# Patient Record
Sex: FEMALE | Race: WHITE | URBAN - METROPOLITAN AREA
[De-identification: names, ages, dates, MRNs, and addresses within clinical notes are randomized per-mention and may not be internally consistent; named-entity substitution may affect disease eponyms.]

---

## 2019-10-12 ENCOUNTER — OUTPATIENT (OUTPATIENT)
Dept: OUTPATIENT SERVICES | Facility: HOSPITAL | Age: 69
LOS: 1 days | Discharge: HOME | End: 2019-10-12

## 2019-10-12 DIAGNOSIS — C50.919 MALIGNANT NEOPLASM OF UNSPECIFIED SITE OF UNSPECIFIED FEMALE BREAST: ICD-10-CM

## 2019-10-17 ENCOUNTER — OUTPATIENT (OUTPATIENT)
Dept: OUTPATIENT SERVICES | Facility: HOSPITAL | Age: 69
LOS: 1 days | Discharge: HOME | End: 2019-10-17

## 2019-10-17 ENCOUNTER — APPOINTMENT (OUTPATIENT)
Dept: HEMATOLOGY ONCOLOGY | Facility: CLINIC | Age: 69
End: 2019-10-17
Payer: MEDICARE

## 2019-10-17 VITALS
SYSTOLIC BLOOD PRESSURE: 160 MMHG | HEART RATE: 107 BPM | DIASTOLIC BLOOD PRESSURE: 124 MMHG | TEMPERATURE: 99.3 F | WEIGHT: 197 LBS | BODY MASS INDEX: 33.63 KG/M2 | HEIGHT: 64 IN | OXYGEN SATURATION: 93 %

## 2019-10-17 DIAGNOSIS — C50.919 MALIGNANT NEOPLASM OF UNSPECIFIED SITE OF UNSPECIFIED FEMALE BREAST: ICD-10-CM

## 2019-10-17 PROCEDURE — 99204 OFFICE O/P NEW MOD 45 MIN: CPT

## 2019-10-20 NOTE — ASSESSMENT
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

## 2019-10-20 NOTE — HISTORY OF PRESENT ILLNESS
[de-identified] : Ms. LOPEZ is a 69 year y/o female presenting for an initial consult regarding treatment options for right breast IDC, 0.55 cm, ER+/MD+, Her2-. She is s/p bilateral mastectomy with bilateral axillary sentinel lymph node biopsies on 9/19/19 with Dr. Raymond Branham. She has hx of asthma, osteoporosis, OA, DM, and HLD. \par \par Surgical Pathology 9/19/19:\par -All left and right axillary sentinel lymph nodes are benign. \par D. Left breast, mastectomy:\par -Fibrocystic changes including florid intraductal hyperplasia. \par -Nipple without significant pathology diagnosis. \par H. Right breast stitch marks tail, mastectomy:\par -biopsy site changes no residual tumor seen.\par -Fibrocystic changes including florid intraductal hyperplasia. \par -Atypical ductal hyperplasia.\par -Nipple without significant pathology diagnosis. \par \par Biopsy Pathology 9/13/19:\par Breast, right, 2:00,needle core biopsy:\par -IDC, grade 2, 0.55 cm \par -ER positive, >90%\par -MD positive, >90%\par -Rlz2tac negative, equivocal 2+ [de-identified] : C/o of her drains tugging on her skin.\par States she was in a lot of pain last night, but no pain today.

## 2019-10-20 NOTE — PHYSICAL EXAM
[Normal] : affect appropriate [de-identified] : bilateral mastectomy, staples in place, drains on both sides

## 2019-10-21 ENCOUNTER — RESULT REVIEW (OUTPATIENT)
Age: 69
End: 2019-10-21

## 2020-04-09 ENCOUNTER — OUTPATIENT (OUTPATIENT)
Dept: OUTPATIENT SERVICES | Facility: HOSPITAL | Age: 70
LOS: 1 days | Discharge: HOME | End: 2020-04-09

## 2020-04-09 DIAGNOSIS — C50.919 MALIGNANT NEOPLASM OF UNSPECIFIED SITE OF UNSPECIFIED FEMALE BREAST: ICD-10-CM

## 2020-04-16 ENCOUNTER — APPOINTMENT (OUTPATIENT)
Dept: HEMATOLOGY ONCOLOGY | Facility: CLINIC | Age: 70
End: 2020-04-16
Payer: MEDICARE

## 2020-04-16 PROCEDURE — 99213 OFFICE O/P EST LOW 20 MIN: CPT | Mod: 95

## 2020-04-16 NOTE — HISTORY OF PRESENT ILLNESS
[Home] : at home, [unfilled] , at the time of the visit. [Other Location: e.g. Home (Enter Location, City,State)___] : at [unfilled] [de-identified] : Purnima consents to telemedicine visit during Covid pandemic\par \par She feels well, tolerating tamoxifen 20 mg ad adjuvant therapy for stage 1 ER+ invasive ductal breast cancer.\par No complaints.\par denies hot flashes or weight gain.

## 2020-04-16 NOTE — ASSESSMENT
[FreeTextEntry1] : Stage 1 ER+, invasive ductal breast cancer, S/P bilateral mastectomy, tolerating adjuvant tamoxifen without difficulty.\par \par Spent 145 minutes discussing current clinical status and future treatment plans.

## 2020-04-16 NOTE — PHYSICAL EXAM
[Fully active, able to carry on all pre-disease performance without restriction] : Status 0 - Fully active, able to carry on all pre-disease performance without restriction [de-identified] : Comfortable at rest

## 2020-08-17 ENCOUNTER — RX RENEWAL (OUTPATIENT)
Age: 70
End: 2020-08-17

## 2021-04-12 ENCOUNTER — OUTPATIENT (OUTPATIENT)
Dept: OUTPATIENT SERVICES | Facility: HOSPITAL | Age: 71
LOS: 1 days | Discharge: HOME | End: 2021-04-12

## 2021-04-12 DIAGNOSIS — C50.919 MALIGNANT NEOPLASM OF UNSPECIFIED SITE OF UNSPECIFIED FEMALE BREAST: ICD-10-CM

## 2021-04-15 ENCOUNTER — APPOINTMENT (OUTPATIENT)
Dept: HEMATOLOGY ONCOLOGY | Facility: CLINIC | Age: 71
End: 2021-04-15
Payer: MEDICARE

## 2021-04-15 ENCOUNTER — RESULT REVIEW (OUTPATIENT)
Age: 71
End: 2021-04-15

## 2021-04-15 VITALS
SYSTOLIC BLOOD PRESSURE: 129 MMHG | BODY MASS INDEX: 33.63 KG/M2 | DIASTOLIC BLOOD PRESSURE: 73 MMHG | HEART RATE: 102 BPM | OXYGEN SATURATION: 97 % | WEIGHT: 197 LBS | HEIGHT: 64 IN

## 2021-04-15 LAB
ALBUMIN SERPL ELPH-MCNC: 4 G/DL
ALP BLD-CCNC: 84 U/L
ALT SERPL-CCNC: 45 U/L
ANION GAP SERPL CALC-SCNC: 11 MMOL/L
AST SERPL-CCNC: 68 U/L
BASOPHILS # BLD AUTO: 0.1 K/UL — SIGNIFICANT CHANGE UP (ref 0–0.2)
BASOPHILS NFR BLD AUTO: 1.2 % — SIGNIFICANT CHANGE UP (ref 0–2)
BILIRUB SERPL-MCNC: 0.4 MG/DL
BUN SERPL-MCNC: 12 MG/DL
CALCIUM SERPL-MCNC: 9.3 MG/DL
CHLORIDE SERPL-SCNC: 104 MMOL/L
CO2 SERPL-SCNC: 27 MMOL/L
CREAT SERPL-MCNC: 0.65 MG/DL
EOSINOPHIL # BLD AUTO: 0.2 K/UL — SIGNIFICANT CHANGE UP (ref 0–0.5)
EOSINOPHIL NFR BLD AUTO: 2.3 % — SIGNIFICANT CHANGE UP (ref 0–6)
GLUCOSE SERPL-MCNC: 146 MG/DL
HCT VFR BLD CALC: 38.4 % — SIGNIFICANT CHANGE UP (ref 34.5–45)
HGB BLD-MCNC: 12.4 G/DL — SIGNIFICANT CHANGE UP (ref 11.5–15.5)
LYMPHOCYTES # BLD AUTO: 1.9 K/UL — SIGNIFICANT CHANGE UP (ref 1–3.3)
LYMPHOCYTES # BLD AUTO: 27.9 % — SIGNIFICANT CHANGE UP (ref 13–44)
MCHC RBC-ENTMCNC: 31.5 PG — SIGNIFICANT CHANGE UP (ref 27–34)
MCHC RBC-ENTMCNC: 32.2 G/DL — SIGNIFICANT CHANGE UP (ref 32–36)
MCV RBC AUTO: 97.8 FL — SIGNIFICANT CHANGE UP (ref 80–100)
MONOCYTES # BLD AUTO: 0.5 K/UL — SIGNIFICANT CHANGE UP (ref 0–0.9)
MONOCYTES NFR BLD AUTO: 7.9 % — SIGNIFICANT CHANGE UP (ref 2–14)
NEUTROPHILS # BLD AUTO: 4.1 K/UL — SIGNIFICANT CHANGE UP (ref 1.8–7.4)
NEUTROPHILS NFR BLD AUTO: 60.6 % — SIGNIFICANT CHANGE UP (ref 43–77)
PLATELET # BLD AUTO: 155 K/UL — SIGNIFICANT CHANGE UP (ref 150–400)
POTASSIUM SERPL-SCNC: 4.2 MMOL/L
PROT SERPL-MCNC: 6.7 G/DL
RBC # BLD: 3.93 M/UL — SIGNIFICANT CHANGE UP (ref 3.8–5.2)
RBC # FLD: 12.9 % — SIGNIFICANT CHANGE UP (ref 10.3–14.5)
SODIUM SERPL-SCNC: 142 MMOL/L
WBC # BLD: 6.8 K/UL — SIGNIFICANT CHANGE UP (ref 3.8–10.5)
WBC # FLD AUTO: 6.8 K/UL — SIGNIFICANT CHANGE UP (ref 3.8–10.5)

## 2021-04-15 PROCEDURE — 99213 OFFICE O/P EST LOW 20 MIN: CPT

## 2021-04-16 NOTE — HISTORY OF PRESENT ILLNESS
[de-identified] : \par Ms. LOPEZ is a 71 year y/o female followed for right breast IDC, 0.55 cm, ER+/HI+, Her2-. She is s/p bilateral mastectomy with bilateral axillary sentinel lymph node biopsies on 9/19/19 with Dr. Raymond Branham. She has hx of asthma, osteoporosis, OA, DM, and HLD. \par \par Surgical Pathology 9/19/19:\par -All left and right axillary sentinel lymph nodes are benign. \par D. Left breast, mastectomy:\par -Fibrocystic changes including florid intraductal hyperplasia. \par -Nipple without significant pathology diagnosis. \par H. Right breast stitch marks tail, mastectomy:\par -biopsy site changes no residual tumor seen.\par -Fibrocystic changes including florid intraductal hyperplasia. \par -Atypical ductal hyperplasia.\par -Nipple without significant pathology diagnosis. \par \par Biopsy Pathology 9/13/19:\par Breast, right, 2:00,needle core biopsy:\par -IDC, grade 2, 0.55 cm \par -ER positive, >90%\par -HI positive, >90%\par -Oai8qid negative, equivocal 2+. \par \par  [de-identified] : Doing well on adjuvant tamoxifen, with no side effects.\par No evidence of recurrent disease.

## 2021-04-16 NOTE — ASSESSMENT
[FreeTextEntry1] : \par Stage 1 ER+, invasive ductal breast cancer, S/P bilateral mastectomy, tolerating adjuvant tamoxifen without difficulty.\par

## 2021-04-16 NOTE — PHYSICAL EXAM
[Normal] : affect appropriate [de-identified] : Status post bilateral mastectomy with no evidence of recurrence

## 2021-10-26 ENCOUNTER — RX RENEWAL (OUTPATIENT)
Age: 71
End: 2021-10-26

## 2022-02-03 ENCOUNTER — INPATIENT (INPATIENT)
Facility: HOSPITAL | Age: 72
LOS: 7 days | Discharge: HOME | DRG: 915 | End: 2022-02-11
Attending: STUDENT IN AN ORGANIZED HEALTH CARE EDUCATION/TRAINING PROGRAM | Admitting: INTERNAL MEDICINE
Payer: MEDICARE

## 2022-02-03 VITALS
TEMPERATURE: 98 F | DIASTOLIC BLOOD PRESSURE: 112 MMHG | RESPIRATION RATE: 18 BRPM | OXYGEN SATURATION: 97 % | HEART RATE: 112 BPM | SYSTOLIC BLOOD PRESSURE: 178 MMHG

## 2022-02-03 DIAGNOSIS — T78.3XXA ANGIONEUROTIC EDEMA, INITIAL ENCOUNTER: ICD-10-CM

## 2022-02-03 LAB
ABO RH CONFIRMATION: SIGNIFICANT CHANGE UP
ALBUMIN SERPL ELPH-MCNC: 4.2 G/DL — SIGNIFICANT CHANGE UP (ref 3.3–5.2)
ALP SERPL-CCNC: 94 U/L — SIGNIFICANT CHANGE UP (ref 40–120)
ALT FLD-CCNC: 52 U/L — HIGH
ANION GAP SERPL CALC-SCNC: 16 MMOL/L — SIGNIFICANT CHANGE UP (ref 5–17)
APTT BLD: 32.5 SEC — SIGNIFICANT CHANGE UP (ref 27.5–35.5)
AST SERPL-CCNC: 77 U/L — HIGH
BASOPHILS # BLD AUTO: 0.1 K/UL — SIGNIFICANT CHANGE UP (ref 0–0.2)
BASOPHILS NFR BLD AUTO: 0.7 % — SIGNIFICANT CHANGE UP (ref 0–2)
BILIRUB SERPL-MCNC: 0.5 MG/DL — SIGNIFICANT CHANGE UP (ref 0.4–2)
BLD GP AB SCN SERPL QL: SIGNIFICANT CHANGE UP
BUN SERPL-MCNC: 17.1 MG/DL — SIGNIFICANT CHANGE UP (ref 8–20)
CALCIUM SERPL-MCNC: 9.7 MG/DL — SIGNIFICANT CHANGE UP (ref 8.6–10.2)
CHLORIDE SERPL-SCNC: 101 MMOL/L — SIGNIFICANT CHANGE UP (ref 98–107)
CO2 SERPL-SCNC: 25 MMOL/L — SIGNIFICANT CHANGE UP (ref 22–29)
CREAT SERPL-MCNC: 0.81 MG/DL — SIGNIFICANT CHANGE UP (ref 0.5–1.3)
EOSINOPHIL # BLD AUTO: 0.22 K/UL — SIGNIFICANT CHANGE UP (ref 0–0.5)
EOSINOPHIL NFR BLD AUTO: 1.6 % — SIGNIFICANT CHANGE UP (ref 0–6)
FLUAV AG NPH QL: SIGNIFICANT CHANGE UP
FLUBV AG NPH QL: SIGNIFICANT CHANGE UP
GAS PNL BLDA: SIGNIFICANT CHANGE UP
GLUCOSE BLDC GLUCOMTR-MCNC: 203 MG/DL — HIGH (ref 70–99)
GLUCOSE BLDC GLUCOMTR-MCNC: 234 MG/DL — HIGH (ref 70–99)
GLUCOSE BLDC GLUCOMTR-MCNC: 245 MG/DL — HIGH (ref 70–99)
GLUCOSE BLDC GLUCOMTR-MCNC: 256 MG/DL — HIGH (ref 70–99)
GLUCOSE SERPL-MCNC: 143 MG/DL — HIGH (ref 70–99)
HCT VFR BLD CALC: 43.9 % — SIGNIFICANT CHANGE UP (ref 34.5–45)
HGB BLD-MCNC: 14.6 G/DL — SIGNIFICANT CHANGE UP (ref 11.5–15.5)
IMM GRANULOCYTES NFR BLD AUTO: 0.4 % — SIGNIFICANT CHANGE UP (ref 0–1.5)
INR BLD: 1.16 RATIO — SIGNIFICANT CHANGE UP (ref 0.88–1.16)
LIDOCAIN IGE QN: 34 U/L — SIGNIFICANT CHANGE UP (ref 22–51)
LYMPHOCYTES # BLD AUTO: 33.6 % — SIGNIFICANT CHANGE UP (ref 13–44)
LYMPHOCYTES # BLD AUTO: 4.54 K/UL — HIGH (ref 1–3.3)
MAGNESIUM SERPL-MCNC: 1.8 MG/DL — SIGNIFICANT CHANGE UP (ref 1.6–2.6)
MCHC RBC-ENTMCNC: 32.3 PG — SIGNIFICANT CHANGE UP (ref 27–34)
MCHC RBC-ENTMCNC: 33.3 GM/DL — SIGNIFICANT CHANGE UP (ref 32–36)
MCV RBC AUTO: 97.1 FL — SIGNIFICANT CHANGE UP (ref 80–100)
MONOCYTES # BLD AUTO: 1.46 K/UL — HIGH (ref 0–0.9)
MONOCYTES NFR BLD AUTO: 10.8 % — SIGNIFICANT CHANGE UP (ref 2–14)
NEUTROPHILS # BLD AUTO: 7.13 K/UL — SIGNIFICANT CHANGE UP (ref 1.8–7.4)
NEUTROPHILS NFR BLD AUTO: 52.9 % — SIGNIFICANT CHANGE UP (ref 43–77)
PHOSPHATE SERPL-MCNC: 4.6 MG/DL — SIGNIFICANT CHANGE UP (ref 2.4–4.7)
PLATELET # BLD AUTO: 212 K/UL — SIGNIFICANT CHANGE UP (ref 150–400)
POTASSIUM SERPL-MCNC: 3.2 MMOL/L — LOW (ref 3.5–5.3)
POTASSIUM SERPL-SCNC: 3.2 MMOL/L — LOW (ref 3.5–5.3)
PROT SERPL-MCNC: 7.6 G/DL — SIGNIFICANT CHANGE UP (ref 6.6–8.7)
PROTHROM AB SERPL-ACNC: 13.4 SEC — SIGNIFICANT CHANGE UP (ref 10.6–13.6)
RBC # BLD: 4.52 M/UL — SIGNIFICANT CHANGE UP (ref 3.8–5.2)
RBC # FLD: 13.1 % — SIGNIFICANT CHANGE UP (ref 10.3–14.5)
RSV RNA NPH QL NAA+NON-PROBE: SIGNIFICANT CHANGE UP
SARS-COV-2 RNA SPEC QL NAA+PROBE: SIGNIFICANT CHANGE UP
SODIUM SERPL-SCNC: 141 MMOL/L — SIGNIFICANT CHANGE UP (ref 135–145)
TROPONIN T SERPL-MCNC: <0.01 NG/ML — SIGNIFICANT CHANGE UP (ref 0–0.06)
WBC # BLD: 13.5 K/UL — HIGH (ref 3.8–10.5)
WBC # FLD AUTO: 13.5 K/UL — HIGH (ref 3.8–10.5)

## 2022-02-03 PROCEDURE — 71260 CT THORAX DX C+: CPT | Mod: 26

## 2022-02-03 PROCEDURE — 71045 X-RAY EXAM CHEST 1 VIEW: CPT | Mod: 26

## 2022-02-03 PROCEDURE — 99291 CRITICAL CARE FIRST HOUR: CPT

## 2022-02-03 PROCEDURE — 70491 CT SOFT TISSUE NECK W/DYE: CPT | Mod: 26

## 2022-02-03 RX ORDER — MEROPENEM 1 G/30ML
2000 INJECTION INTRAVENOUS ONCE
Refills: 0 | Status: COMPLETED | OUTPATIENT
Start: 2022-02-03 | End: 2022-02-03

## 2022-02-03 RX ORDER — TAMOXIFEN CITRATE 20 MG/1
1 TABLET, FILM COATED ORAL
Qty: 0 | Refills: 0 | DISCHARGE

## 2022-02-03 RX ORDER — DEXAMETHASONE 0.5 MG/5ML
6 ELIXIR ORAL EVERY 6 HOURS
Refills: 0 | Status: DISCONTINUED | OUTPATIENT
Start: 2022-02-03 | End: 2022-02-07

## 2022-02-03 RX ORDER — FAMOTIDINE 10 MG/ML
20 INJECTION INTRAVENOUS ONCE
Refills: 0 | Status: COMPLETED | OUTPATIENT
Start: 2022-02-03 | End: 2022-02-03

## 2022-02-03 RX ORDER — FAMOTIDINE 10 MG/ML
20 INJECTION INTRAVENOUS EVERY 12 HOURS
Refills: 0 | Status: DISCONTINUED | OUTPATIENT
Start: 2022-02-03 | End: 2022-02-07

## 2022-02-03 RX ORDER — GLUCAGON INJECTION, SOLUTION 0.5 MG/.1ML
1 INJECTION, SOLUTION SUBCUTANEOUS ONCE
Refills: 0 | Status: DISCONTINUED | OUTPATIENT
Start: 2022-02-03 | End: 2022-02-07

## 2022-02-03 RX ORDER — ASPIRIN/CALCIUM CARB/MAGNESIUM 324 MG
1 TABLET ORAL
Qty: 0 | Refills: 0 | DISCHARGE

## 2022-02-03 RX ORDER — SODIUM CHLORIDE 9 MG/ML
1000 INJECTION, SOLUTION INTRAVENOUS
Refills: 0 | Status: DISCONTINUED | OUTPATIENT
Start: 2022-02-03 | End: 2022-02-09

## 2022-02-03 RX ORDER — MEROPENEM 1 G/30ML
INJECTION INTRAVENOUS
Refills: 0 | Status: COMPLETED | OUTPATIENT
Start: 2022-02-03 | End: 2022-02-07

## 2022-02-03 RX ORDER — CHLORHEXIDINE GLUCONATE 213 G/1000ML
15 SOLUTION TOPICAL EVERY 12 HOURS
Refills: 0 | Status: DISCONTINUED | OUTPATIENT
Start: 2022-02-03 | End: 2022-02-09

## 2022-02-03 RX ORDER — DEXTROSE 50 % IN WATER 50 %
12.5 SYRINGE (ML) INTRAVENOUS ONCE
Refills: 0 | Status: DISCONTINUED | OUTPATIENT
Start: 2022-02-03 | End: 2022-02-11

## 2022-02-03 RX ORDER — MEROPENEM 1 G/30ML
2000 INJECTION INTRAVENOUS EVERY 8 HOURS
Refills: 0 | Status: COMPLETED | OUTPATIENT
Start: 2022-02-03 | End: 2022-02-07

## 2022-02-03 RX ORDER — POTASSIUM CHLORIDE 20 MEQ
10 PACKET (EA) ORAL
Refills: 0 | Status: COMPLETED | OUTPATIENT
Start: 2022-02-03 | End: 2022-02-03

## 2022-02-03 RX ORDER — DEXTROSE 50 % IN WATER 50 %
25 SYRINGE (ML) INTRAVENOUS ONCE
Refills: 0 | Status: DISCONTINUED | OUTPATIENT
Start: 2022-02-03 | End: 2022-02-11

## 2022-02-03 RX ORDER — INSULIN LISPRO 100/ML
VIAL (ML) SUBCUTANEOUS EVERY 6 HOURS
Refills: 0 | Status: DISCONTINUED | OUTPATIENT
Start: 2022-02-03 | End: 2022-02-11

## 2022-02-03 RX ORDER — FENTANYL CITRATE 50 UG/ML
0.5 INJECTION INTRAVENOUS
Qty: 2500 | Refills: 0 | Status: DISCONTINUED | OUTPATIENT
Start: 2022-02-03 | End: 2022-02-07

## 2022-02-03 RX ORDER — DEXTROSE 50 % IN WATER 50 %
15 SYRINGE (ML) INTRAVENOUS ONCE
Refills: 0 | Status: DISCONTINUED | OUTPATIENT
Start: 2022-02-03 | End: 2022-02-09

## 2022-02-03 RX ORDER — FLUTICASONE PROPIONATE 50 MCG
1 SPRAY, SUSPENSION NASAL
Qty: 0 | Refills: 0 | DISCHARGE

## 2022-02-03 RX ORDER — MEROPENEM 1 G/30ML
2 INJECTION INTRAVENOUS EVERY 8 HOURS
Refills: 0 | Status: DISCONTINUED | OUTPATIENT
Start: 2022-02-03 | End: 2022-02-03

## 2022-02-03 RX ORDER — INSULIN GLARGINE 100 [IU]/ML
15 INJECTION, SOLUTION SUBCUTANEOUS ONCE
Refills: 0 | Status: COMPLETED | OUTPATIENT
Start: 2022-02-03 | End: 2022-02-03

## 2022-02-03 RX ORDER — AMPICILLIN SODIUM AND SULBACTAM SODIUM 250; 125 MG/ML; MG/ML
3 INJECTION, POWDER, FOR SUSPENSION INTRAMUSCULAR; INTRAVENOUS EVERY 6 HOURS
Refills: 0 | Status: DISCONTINUED | OUTPATIENT
Start: 2022-02-03 | End: 2022-02-03

## 2022-02-03 RX ORDER — AMPICILLIN SODIUM AND SULBACTAM SODIUM 250; 125 MG/ML; MG/ML
INJECTION, POWDER, FOR SUSPENSION INTRAMUSCULAR; INTRAVENOUS
Refills: 0 | Status: DISCONTINUED | OUTPATIENT
Start: 2022-02-03 | End: 2022-02-03

## 2022-02-03 RX ORDER — NOREPINEPHRINE BITARTRATE/D5W 8 MG/250ML
0.05 PLASTIC BAG, INJECTION (ML) INTRAVENOUS
Qty: 8 | Refills: 0 | Status: DISCONTINUED | OUTPATIENT
Start: 2022-02-03 | End: 2022-02-07

## 2022-02-03 RX ORDER — CYCLOBENZAPRINE HYDROCHLORIDE 10 MG/1
1 TABLET, FILM COATED ORAL
Qty: 0 | Refills: 0 | DISCHARGE

## 2022-02-03 RX ORDER — ENOXAPARIN SODIUM 100 MG/ML
40 INJECTION SUBCUTANEOUS DAILY
Refills: 0 | Status: DISCONTINUED | OUTPATIENT
Start: 2022-02-03 | End: 2022-02-11

## 2022-02-03 RX ORDER — DIPHENHYDRAMINE HCL 50 MG
50 CAPSULE ORAL ONCE
Refills: 0 | Status: COMPLETED | OUTPATIENT
Start: 2022-02-03 | End: 2022-02-03

## 2022-02-03 RX ORDER — PROPOFOL 10 MG/ML
10 INJECTION, EMULSION INTRAVENOUS
Qty: 1000 | Refills: 0 | Status: DISCONTINUED | OUTPATIENT
Start: 2022-02-03 | End: 2022-02-04

## 2022-02-03 RX ORDER — VECURONIUM BROMIDE 20 MG/1
10 INJECTION, POWDER, FOR SOLUTION INTRAVENOUS ONCE
Refills: 0 | Status: COMPLETED | OUTPATIENT
Start: 2022-02-03 | End: 2022-02-03

## 2022-02-03 RX ORDER — INSULIN GLARGINE 100 [IU]/ML
25 INJECTION, SOLUTION SUBCUTANEOUS AT BEDTIME
Refills: 0 | Status: DISCONTINUED | OUTPATIENT
Start: 2022-02-04 | End: 2022-02-11

## 2022-02-03 RX ORDER — DIPHENHYDRAMINE HCL 50 MG
50 CAPSULE ORAL EVERY 6 HOURS
Refills: 0 | Status: DISCONTINUED | OUTPATIENT
Start: 2022-02-03 | End: 2022-02-07

## 2022-02-03 RX ORDER — DEXAMETHASONE 0.5 MG/5ML
10 ELIXIR ORAL ONCE
Refills: 0 | Status: COMPLETED | OUTPATIENT
Start: 2022-02-03 | End: 2022-02-03

## 2022-02-03 RX ORDER — AMPICILLIN SODIUM AND SULBACTAM SODIUM 250; 125 MG/ML; MG/ML
3 INJECTION, POWDER, FOR SUSPENSION INTRAMUSCULAR; INTRAVENOUS ONCE
Refills: 0 | Status: DISCONTINUED | OUTPATIENT
Start: 2022-02-03 | End: 2022-02-03

## 2022-02-03 RX ORDER — MIDAZOLAM HYDROCHLORIDE 1 MG/ML
2 INJECTION, SOLUTION INTRAMUSCULAR; INTRAVENOUS
Refills: 0 | Status: DISCONTINUED | OUTPATIENT
Start: 2022-02-03 | End: 2022-02-07

## 2022-02-03 RX ORDER — INSULIN LISPRO 100/ML
6 VIAL (ML) SUBCUTANEOUS EVERY 6 HOURS
Refills: 0 | Status: DISCONTINUED | OUTPATIENT
Start: 2022-02-03 | End: 2022-02-11

## 2022-02-03 RX ORDER — EPINEPHRINE 0.3 MG/.3ML
0.3 INJECTION INTRAMUSCULAR; SUBCUTANEOUS ONCE
Refills: 0 | Status: COMPLETED | OUTPATIENT
Start: 2022-02-03 | End: 2022-02-03

## 2022-02-03 RX ORDER — CHLORHEXIDINE GLUCONATE 213 G/1000ML
1 SOLUTION TOPICAL
Refills: 0 | Status: DISCONTINUED | OUTPATIENT
Start: 2022-02-03 | End: 2022-02-11

## 2022-02-03 RX ADMIN — FENTANYL CITRATE 4 MICROGRAM(S)/KG/HR: 50 INJECTION INTRAVENOUS at 06:14

## 2022-02-03 RX ADMIN — MIDAZOLAM HYDROCHLORIDE 2 MILLIGRAM(S): 1 INJECTION, SOLUTION INTRAMUSCULAR; INTRAVENOUS at 23:02

## 2022-02-03 RX ADMIN — CHLORHEXIDINE GLUCONATE 15 MILLILITER(S): 213 SOLUTION TOPICAL at 06:20

## 2022-02-03 RX ADMIN — Medication 6 MILLIGRAM(S): at 23:03

## 2022-02-03 RX ADMIN — FAMOTIDINE 20 MILLIGRAM(S): 10 INJECTION INTRAVENOUS at 17:43

## 2022-02-03 RX ADMIN — Medication 50 MILLIGRAM(S): at 15:11

## 2022-02-03 RX ADMIN — Medication 100 MILLIEQUIVALENT(S): at 09:14

## 2022-02-03 RX ADMIN — EPINEPHRINE 0.3 MILLIGRAM(S): 0.3 INJECTION INTRAMUSCULAR; SUBCUTANEOUS at 03:31

## 2022-02-03 RX ADMIN — MIDAZOLAM HYDROCHLORIDE 2 MILLIGRAM(S): 1 INJECTION, SOLUTION INTRAMUSCULAR; INTRAVENOUS at 15:57

## 2022-02-03 RX ADMIN — Medication 7.5 MICROGRAM(S)/KG/MIN: at 06:14

## 2022-02-03 RX ADMIN — Medication 100 MILLIEQUIVALENT(S): at 07:38

## 2022-02-03 RX ADMIN — FAMOTIDINE 20 MILLIGRAM(S): 10 INJECTION INTRAVENOUS at 03:32

## 2022-02-03 RX ADMIN — MEROPENEM 200 MILLIGRAM(S): 1 INJECTION INTRAVENOUS at 07:40

## 2022-02-03 RX ADMIN — Medication 40 MILLIGRAM(S): at 12:13

## 2022-02-03 RX ADMIN — Medication 50 MILLIGRAM(S): at 06:20

## 2022-02-03 RX ADMIN — FENTANYL CITRATE 4 MICROGRAM(S)/KG/HR: 50 INJECTION INTRAVENOUS at 15:57

## 2022-02-03 RX ADMIN — Medication 50 MILLIGRAM(S): at 21:49

## 2022-02-03 RX ADMIN — Medication 6 MILLIGRAM(S): at 12:20

## 2022-02-03 RX ADMIN — Medication 6 MILLIGRAM(S): at 06:16

## 2022-02-03 RX ADMIN — Medication 50 MILLIGRAM(S): at 12:20

## 2022-02-03 RX ADMIN — PROPOFOL 4.8 MICROGRAM(S)/KG/MIN: 10 INJECTION, EMULSION INTRAVENOUS at 15:57

## 2022-02-03 RX ADMIN — MEROPENEM 200 MILLIGRAM(S): 1 INJECTION INTRAVENOUS at 15:11

## 2022-02-03 RX ADMIN — PROPOFOL 4.8 MICROGRAM(S)/KG/MIN: 10 INJECTION, EMULSION INTRAVENOUS at 19:16

## 2022-02-03 RX ADMIN — PROPOFOL 4.8 MICROGRAM(S)/KG/MIN: 10 INJECTION, EMULSION INTRAVENOUS at 23:08

## 2022-02-03 RX ADMIN — Medication 4: at 06:24

## 2022-02-03 RX ADMIN — Medication 3 UNIT(S): at 23:03

## 2022-02-03 RX ADMIN — MEROPENEM 200 MILLIGRAM(S): 1 INJECTION INTRAVENOUS at 23:05

## 2022-02-03 RX ADMIN — Medication 4: at 17:44

## 2022-02-03 RX ADMIN — PROPOFOL 4.8 MICROGRAM(S)/KG/MIN: 10 INJECTION, EMULSION INTRAVENOUS at 06:14

## 2022-02-03 RX ADMIN — Medication 4: at 23:03

## 2022-02-03 RX ADMIN — Medication 40 MILLIGRAM(S): at 20:32

## 2022-02-03 RX ADMIN — VECURONIUM BROMIDE 10 MILLIGRAM(S): 20 INJECTION, POWDER, FOR SOLUTION INTRAVENOUS at 15:58

## 2022-02-03 RX ADMIN — MIDAZOLAM HYDROCHLORIDE 2 MILLIGRAM(S): 1 INJECTION, SOLUTION INTRAMUSCULAR; INTRAVENOUS at 06:15

## 2022-02-03 RX ADMIN — Medication 6 MILLIGRAM(S): at 17:43

## 2022-02-03 RX ADMIN — ENOXAPARIN SODIUM 40 MILLIGRAM(S): 100 INJECTION SUBCUTANEOUS at 12:21

## 2022-02-03 RX ADMIN — INSULIN GLARGINE 15 UNIT(S): 100 INJECTION, SOLUTION SUBCUTANEOUS at 16:37

## 2022-02-03 RX ADMIN — CHLORHEXIDINE GLUCONATE 1 APPLICATION(S): 213 SOLUTION TOPICAL at 06:21

## 2022-02-03 RX ADMIN — FAMOTIDINE 20 MILLIGRAM(S): 10 INJECTION INTRAVENOUS at 06:15

## 2022-02-03 RX ADMIN — Medication 6: at 12:33

## 2022-02-03 RX ADMIN — Medication 10 MILLIGRAM(S): at 03:32

## 2022-02-03 RX ADMIN — Medication 100 MILLIEQUIVALENT(S): at 06:21

## 2022-02-03 RX ADMIN — CHLORHEXIDINE GLUCONATE 15 MILLILITER(S): 213 SOLUTION TOPICAL at 17:44

## 2022-02-03 NOTE — ED PROVIDER NOTE - CRITICAL CARE ATTENDING CONTRIBUTION TO CARE
Upon my evaluation, this patient had a high probability of imminent or life-threatening deterioration due to ace inhibitor angioedema which required my direct attention, intervention, and personal management.  The patient has a  medical condition that impairs one or more vital organ systems.  Frequent personal assessment and adjustment of medical interventions was performed.      I have personally provided 35 minutes of critical care time exclusive of time spent on separately billable procedures. Time includes review of laboratory data, radiology results, discussion with consultants, patient and family; monitoring for potential decompensation, as well as time spent retrieving data and reviewing the chart and documenting the visit. Interventions were performed as documented above.

## 2022-02-03 NOTE — ED ADULT NURSE NOTE - OBJECTIVE STATEMENT
Assumed care of pt at 03:12 in CC. Pt A&Ox3 c/o difficulty speaking s/p angioedema. As per pt, she takes PO "lisinopril for many years" and woke up from sleep c/o difficulty breathing and speech difficulties. Pt bought to CC area of ED, MD Webster and MD Ochoa at bedside w/ ED code team. Anesthesia called to bedside. Pt placed on CM in ST and SPO2 WNL on RA.

## 2022-02-03 NOTE — H&P ADULT - ATTENDING COMMENTS
71F w/ PMHx HTN, asthma, B/L mastectomy presented on 02/03 w/ acute onset swelling of her tongue and base of mouth. Emergently taken to the OR and underwent nasotracheal intubation by anesthesiology w/ ACS standby   Likely Cristian's angina. Angioedema in setting of ACEI also in differential     Given 1U FFP in ED, plan to give 2more units in addition to IV steroids, H1/H2 blockers  CT neck w/ contrast, BCx and started on Merrem 2g q8 (PCN allergy). Will need ENT eval   Critical airway, maintain RAAS -3 to -5, CXR pending  Called and updated daughter

## 2022-02-03 NOTE — ED PROVIDER NOTE - PHYSICAL EXAMINATION
Gen: in moderate respiratory distress   Head: NC/AT, + submandibular swelling, + tongue swelling, not tolerating secretion, cannot visualize oropharynx   Neck: trachea midline, no crepitus   Card: tachycardic, regular rhythm   Resp:  CTAB  Abd: soft, non-tender  Ext: no deformities  Neuro:  A&O appears non focal  Skin:  Warm and dry as visualized  Psych:  Normal affect and mood Gen: in moderate respiratory distress   Head: NC/AT, + large amount of submandibular swelling, + tongue diffusely edematous, obstructing entire mouth, not tolerating secretion. No crepitus.  Neck: trachea midline  Card: tachycardic, regular rhythm   Resp:  CTAB  Abd: soft, non-tender  Ext: no deformities  Neuro:  A&O appears non focal  Skin:  Warm and dry as visualized  Psych:  Normal affect and mood

## 2022-02-03 NOTE — H&P ADULT - NSHPLABSRESULTS_GEN_ALL_CORE
14.6   13.50 )-----------( 212      ( 03 Feb 2022 03:22 )             43.9   02-03    141  |  101  |  17.1  ----------------------------<  143<H>  3.2<L>   |  25.0  |  0.81    Ca    9.7      03 Feb 2022 03:22  Phos  4.6     02-03  Mg     1.8     02-03    TPro  7.6  /  Alb  4.2  /  TBili  0.5  /  DBili  x   /  AST  77<H>  /  ALT  52<H>  /  AlkPhos  94  02-03

## 2022-02-03 NOTE — ED ADULT TRIAGE NOTE - CHIEF COMPLAINT QUOTE
Ambulatory with acute angioedema unable to tolerate secretions. Snoring respirations. Brought straight to critical care, anesthesia called for possible intubation.

## 2022-02-03 NOTE — ED PROVIDER NOTE - OBJECTIVE STATEMENT
70 y/o female with PMHx of HTN, asthma presents to the ED with angioedema. Pt is on Lisinopril (has been on for a long time) and states she went to bed fine and woke up around 1:30 with difficulty breathing, facial, tongue and submandibular swelling.     Pt daughter  Jamilah

## 2022-02-03 NOTE — PATIENT PROFILE ADULT - FALL HARM RISK - HARM RISK INTERVENTIONS

## 2022-02-03 NOTE — ED ADULT NURSE NOTE - NSFALLRSKINDICATORS_ED_ALL_ED
Paul Carlos (:  1976) is a 39 y.o. female, here for evaluation of the following chief complaint(s):  No chief complaint on file. ASSESSMENT/PLAN:  1. Chronic rhinitis  2. Deviated nasal septum  3. Hyposmia  4. Nasal valve collapse  5. Inferior turbinate hypertrophy      This is a very pleasant 39 y.o. female here today for evaluation of the the above-noted complaints. Regarding her sinonasal complaints, we discussed that she would be a good candidate for an open septorhinoplasty to address her nasal valve collapse,  septal deviation and turbinate hypertrophy as well as posterior ablation of her nasal nerves. We will plan to do alar batten grafts address her external nasal valve collapse and possible  grafts. Pre-operative pictures were obtained and are available in the EMR. I discussed with her that I do not think she is getting sinus infections but rather flares of her nasal allergies. If she were to undergo surgery the goals of surgery would be to help her breathe better through her nose as well as to improve the application of topical nasal steroids. I did discuss with her that this may not improve her symptoms that she perceives as frequent sinus infections and she expressed understanding.      -Risks and benefits of septoplasty, repair of nasal valve collapse, inferior turbinate reduction and posterior ablation of nasal nerves including pain, inflammation, infection, hemorrhage, cosmesis (including nasal valve collapse, widening of the nose or saddle nose deformity), worsened nasal airway obstruction, hyposmia/anosmia, numbness to the teeth or gums, cosmetic changes to the nose and injury to the septum including septal perforation, need for further surgery, headaches and other potential complications.   -General anesthesia carries independent risks which will be discussed by Anesthesiology on the day of surgery   -After a discussion of risks and benefits, the patient is in agreement with plan for the above procedure    The patient was counseled at length about the risks of tariq Covid-19 during their perioperative period and any recovery window from their procedure. The patient was made aware that tariq Covid-19  may worsen their prognosis for recovering from their procedure  and lend to a higher morbidity and/or mortality risk. All material risks, benefits, and reasonable alternatives including postponing the procedure were discussed. The patient does wish to proceed with the procedure at this time. SUBJECTIVE/OBJECTIVE:  Dena Johnson is here today for evaluation of evaluation of issues related to her nose. She gets multiple sinus infections per year. She requires antibiotics for these. She think she has a sinus infection right now. She also has over the last 3 months had a loss of sense of smell. She has not had Covid recently. She has had some issues related to smelling gasoline and cigarette smoke that is not actually present. She gets difficulty breathing through her nose it is worse on the left side. She will get constant nasal drainage that is clear and green. She gets very rare nosebleeds. Update 3/17/2012:    She is still having issues breathing out of her nose and with nasal congestion. She is still getting intermittent nasal drainage. Update 4/28/2021:    Patient presents today for follow-up. She is still having the same issues regarded to difficulty breathing out of her nose. She gets intermittent nasal drainage which is clear. She thinks she is getting sinus infections. Update 8/26/2021:    Patient presents today for follow-up regarding issues related to her chronic sinonasal complaints. She is still having significant difficulty breathing through her nose. This is exacerbated by exercise.   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 REVIEW OF SYSTEMS  The following systems were reviewed and revealed the following in addition to any already discussed in the HPI:    PHYSICAL EXAM    GENERAL: No acute distress, alert and oriented, no hoarseness, strong voice  EYES: EOMI, Anti-icteric  HENT:   Head: Normocephalic and atraumatic. Face:  Symmetric, facial nerve intact, no sinus tenderness  Right Ear: Normal external ear, normal external auditory canal, intact tympanic membrane with normal mobility and aerated middle ear  Left Ear: Normal external ear, normal external auditory canal, intact tympanic membrane with normal mobility and aerated middle ear  Mouth/Oral Cavity:  normal lips, Uvula is midline, no mucosal lesions, no trismus, normal dentition, normal salivary quality/flow  Oropharynx/Larynx:  normal oropharynx, 2+ tonsils; patient did not tolerate mirror exam due to excessive gag reflex  Nose:Normal external nasal appearance. Anterior rhinoscopy shows  a deviated septum preventing view posteriorly. Inflammation of turbinates. Normal mucosa   NECK: Normal range of motion, no thyromegaly, trachea is midline, no lymphadenopathy, no neck masses, no crepitus  CHEST: Normal respiratory effort, no retractions, breathing comfortably  SKIN: No rashes, normal appearing skin, no evidence of skin lesions/tumors  Neuro:  cranial nerve II-XII intact; normal gait  Cardio:  no edema    Modified Edith maneuver produced significant improvement in nasal airflow bilaterally. PROCEDURE  Nasal Endoscopy (CPT code 81168) from previous visit    Preop: chronic rhinitis  Postop: Same    Verbal consent was received. After topical anesthesia and decongestion had been obtained using aerosolized 1% lidocaine and oxymetazoline, a 45 degree rigid endoscope was placed into both nares with the patient in a sitting position.  The following was observed:    Right Nasal Cavity and Paranasal Sinuses:  Polyp score = 0 (0 = no polyps, 1 = small polyps in middle meatus not reaching below the inferior border of the middle yaron, 2 = polyps reaching below the middle border of the middle turbinate, 3= large polyps reaching the lower border of the inferior turbinate or polyps medial to the middle yaron, 4= large polyps causing almost complete congestion/obstruction of the interior meatus)  Edema score = 1 (0 = absent, 1 = mild, 2 = severe)  Discharge score = 0 (0 = no discharge, 1 = clear thin discharge, 2 = thick purulent discharge)    Left Nasal Cavity and Paranasal Sinuses:    Polyp score = 0 (0 = no polyps, 1 = small polyps in middle meatus not reaching below the inferior border of the middle yaron, 2 = polyps reaching below the middle border of the middle turbinate, 3= large polyps reaching the lower border of the inferior turbinate or polyps medial to the middle yaron, 4= large polyps causing almost complete congestion/obstruction of the interior meatus)  Edema score = 1 (0 = absent, 1 = mild, 2 = severe)  Discharge score = 0 (0 = no discharge, 1 = clear thin discharge, 2 = thick purulent discharge)    Septum: intact and deviated   Other:   -The inferior and middle turbinates were examined. The middle meatus, and sphenoethmoid recess was examined bilaterally.    -There is a left high septal deviation. There is some mild inflammation of the bilateral olfactory cleft. There is some crusting scattered throughout the nasal cavities. There is inferior turbinate hypertrophy. There are bilateral narrow nasal valves on endoscopic examination.  -There were no complications. Tolerated well without complication. I attest that I was present for and did the entire procedure myself. This note was generated completely or in part utilizing Dragon dictation speech recognition software. Occasionally, words are mistranscribed and despite editing, the text may contain inaccuracies due to incorrect word recognition.   If further clarification is needed please contact the office at [phone]. An electronic signature was used to authenticate this note.     --Rufino Mendez MD no

## 2022-02-03 NOTE — H&P ADULT - ASSESSMENT
70 y/o F w/ a PMHx of HTN and asthma admitted w/:    1. ACE-I induced angioedema versus Cristian's angina  2. Respiratory failure  3. Distributive shock  4. Hypokalemia    PLAN:  - Deeply sedated w/ propofol and fentanyl infusions.  - Distributive shock state 2/2 sedation. Actively titrating levophed to maintain MAP > 65. Monitor end points of perfusion.  - Obtain STAT CXR.  - Full ventilatory support (18/450/50/5). Obtain post intubation ABG. Will make ventilator adjustments as needed. Vent bundle.  - NPO.  - STAT blood cultures. Start meropenem empirically (penicillin allergy per daughter).  - Obtain CT neck w/ IV contrast.  - ENT consult.  - S/p 1u FFP in ER. Administer an additional 2u FFP.  - Decadron 6mg q6 hrs.  - Benadryl plus pepcid.   - Replete potassium.  - Insulin sliding scale. Accu-Cheks q6 hrs while NPO. Send hgba1c.  - Chemical DVT prophylaxis w/ lovenox.    Spoke w/ pt's daughter, Giovanna, via telephone. Updated/all questions answered.    Case discussed w/ ICU attending, Dr. Hernandez.

## 2022-02-03 NOTE — H&P ADULT - HISTORY OF PRESENT ILLNESS
72 y/o F w/ a PMHx of HTN and asthma who presented to the ER w/ acute onset of tongue/submandibular swelling and difficulty breathing. Of note, pt has been taking lisinopril for a long time. Pt was urgently taken to the OR and nasotracheal intubated by anesthesia. Admitted to MICU for continuation of care.

## 2022-02-03 NOTE — ED PROVIDER NOTE - CLINICAL SUMMARY MEDICAL DECISION MAKING FREE TEXT BOX
Pt with likely Lisinopril induced angioedema anesthesia MICU and surgery called to bedside IM epi, FFP, Pepcid decadron given. decision made by surgery/ anesthesia for intubation in OR. Will admit to MICU. Pt with likely Lisinopril induced angioedema. anesthesia, MICU and surgery called to bedside. IM epi, FFP, IV Pepcid, IV decadron given. decision made by surgery/ anesthesia for intubation in OR. MICU to take patient s/p OR.

## 2022-02-03 NOTE — H&P ADULT - NSHPPHYSICALEXAM_GEN_ALL_CORE
General: Intubated.  Eyes: PERRL.  Neck: Submandibular swelling.  Mouth: Tongue swelling.  Heart: Regular rate and rhythm, +s1/s2.  Lungs: Clear to auscultation b/l.  Abdomen: Soft, nontender, nondistended.  Extremities: No edema.  Skin: Warm, dry, intact.  Neuro: Sedated.

## 2022-02-03 NOTE — ED ADULT NURSE NOTE - CHPI ED NUR SYMPTOMS POS
DIFFICULTY BREATHING/DIFFICULTY SWALLOWING/ITCHING/SHORTNESS OF BREATH/SWELLING OF FACE, TONGUE/THROAT ITCHING

## 2022-02-04 LAB
A1C WITH ESTIMATED AVERAGE GLUCOSE RESULT: 8.2 % — HIGH (ref 4–5.6)
ALBUMIN SERPL ELPH-MCNC: 3 G/DL — LOW (ref 3.3–5.2)
ALP SERPL-CCNC: 72 U/L — SIGNIFICANT CHANGE UP (ref 40–120)
ALT FLD-CCNC: 35 U/L — HIGH
ANION GAP SERPL CALC-SCNC: 14 MMOL/L — SIGNIFICANT CHANGE UP (ref 5–17)
AST SERPL-CCNC: 44 U/L — HIGH
BASOPHILS # BLD AUTO: 0.01 K/UL — SIGNIFICANT CHANGE UP (ref 0–0.2)
BASOPHILS NFR BLD AUTO: 0.1 % — SIGNIFICANT CHANGE UP (ref 0–2)
BILIRUB SERPL-MCNC: 0.4 MG/DL — SIGNIFICANT CHANGE UP (ref 0.4–2)
BUN SERPL-MCNC: 14.6 MG/DL — SIGNIFICANT CHANGE UP (ref 8–20)
CALCIUM SERPL-MCNC: 7.7 MG/DL — LOW (ref 8.6–10.2)
CHLORIDE SERPL-SCNC: 104 MMOL/L — SIGNIFICANT CHANGE UP (ref 98–107)
CO2 SERPL-SCNC: 21 MMOL/L — LOW (ref 22–29)
CREAT SERPL-MCNC: 0.63 MG/DL — SIGNIFICANT CHANGE UP (ref 0.5–1.3)
EOSINOPHIL # BLD AUTO: 0 K/UL — SIGNIFICANT CHANGE UP (ref 0–0.5)
EOSINOPHIL NFR BLD AUTO: 0 % — SIGNIFICANT CHANGE UP (ref 0–6)
ESTIMATED AVERAGE GLUCOSE: 189 MG/DL — HIGH (ref 68–114)
GAS PNL BLDA: SIGNIFICANT CHANGE UP
GLUCOSE BLDC GLUCOMTR-MCNC: 171 MG/DL — HIGH (ref 70–99)
GLUCOSE BLDC GLUCOMTR-MCNC: 178 MG/DL — HIGH (ref 70–99)
GLUCOSE BLDC GLUCOMTR-MCNC: 183 MG/DL — HIGH (ref 70–99)
GLUCOSE BLDC GLUCOMTR-MCNC: 244 MG/DL — HIGH (ref 70–99)
GLUCOSE SERPL-MCNC: 228 MG/DL — HIGH (ref 70–99)
HCT VFR BLD CALC: 34 % — LOW (ref 34.5–45)
HCV AB S/CO SERPL IA: 0.12 S/CO — SIGNIFICANT CHANGE UP (ref 0–0.99)
HCV AB SERPL-IMP: SIGNIFICANT CHANGE UP
HGB BLD-MCNC: 11.6 G/DL — SIGNIFICANT CHANGE UP (ref 11.5–15.5)
IMM GRANULOCYTES NFR BLD AUTO: 0.7 % — SIGNIFICANT CHANGE UP (ref 0–1.5)
LYMPHOCYTES # BLD AUTO: 1.06 K/UL — SIGNIFICANT CHANGE UP (ref 1–3.3)
LYMPHOCYTES # BLD AUTO: 7.2 % — LOW (ref 13–44)
MAGNESIUM SERPL-MCNC: 1.9 MG/DL — SIGNIFICANT CHANGE UP (ref 1.6–2.6)
MCHC RBC-ENTMCNC: 33 PG — SIGNIFICANT CHANGE UP (ref 27–34)
MCHC RBC-ENTMCNC: 34.1 GM/DL — SIGNIFICANT CHANGE UP (ref 32–36)
MCV RBC AUTO: 96.9 FL — SIGNIFICANT CHANGE UP (ref 80–100)
MONOCYTES # BLD AUTO: 0.4 K/UL — SIGNIFICANT CHANGE UP (ref 0–0.9)
MONOCYTES NFR BLD AUTO: 2.7 % — SIGNIFICANT CHANGE UP (ref 2–14)
NEUTROPHILS # BLD AUTO: 13.18 K/UL — HIGH (ref 1.8–7.4)
NEUTROPHILS NFR BLD AUTO: 89.3 % — HIGH (ref 43–77)
PHOSPHATE SERPL-MCNC: 2.8 MG/DL — SIGNIFICANT CHANGE UP (ref 2.4–4.7)
PLATELET # BLD AUTO: 173 K/UL — SIGNIFICANT CHANGE UP (ref 150–400)
POTASSIUM SERPL-MCNC: 3.7 MMOL/L — SIGNIFICANT CHANGE UP (ref 3.5–5.3)
POTASSIUM SERPL-SCNC: 3.7 MMOL/L — SIGNIFICANT CHANGE UP (ref 3.5–5.3)
PROCALCITONIN SERPL-MCNC: 0.13 NG/ML — HIGH (ref 0.02–0.1)
PROT SERPL-MCNC: 6.4 G/DL — LOW (ref 6.6–8.7)
RBC # BLD: 3.51 M/UL — LOW (ref 3.8–5.2)
RBC # FLD: 13.2 % — SIGNIFICANT CHANGE UP (ref 10.3–14.5)
SODIUM SERPL-SCNC: 139 MMOL/L — SIGNIFICANT CHANGE UP (ref 135–145)
WBC # BLD: 14.75 K/UL — HIGH (ref 3.8–10.5)
WBC # FLD AUTO: 14.75 K/UL — HIGH (ref 3.8–10.5)

## 2022-02-04 PROCEDURE — 99233 SBSQ HOSP IP/OBS HIGH 50: CPT

## 2022-02-04 PROCEDURE — 70490 CT SOFT TISSUE NECK W/O DYE: CPT | Mod: 26

## 2022-02-04 PROCEDURE — 70450 CT HEAD/BRAIN W/O DYE: CPT | Mod: 26

## 2022-02-04 PROCEDURE — 71250 CT THORAX DX C-: CPT | Mod: 26

## 2022-02-04 RX ORDER — MIDAZOLAM HYDROCHLORIDE 1 MG/ML
0.02 INJECTION, SOLUTION INTRAMUSCULAR; INTRAVENOUS
Qty: 100 | Refills: 0 | Status: DISCONTINUED | OUTPATIENT
Start: 2022-02-04 | End: 2022-02-07

## 2022-02-04 RX ORDER — SODIUM CHLORIDE 9 MG/ML
1000 INJECTION, SOLUTION INTRAVENOUS
Refills: 0 | Status: DISCONTINUED | OUTPATIENT
Start: 2022-02-04 | End: 2022-02-07

## 2022-02-04 RX ADMIN — Medication 2: at 17:20

## 2022-02-04 RX ADMIN — CHLORHEXIDINE GLUCONATE 15 MILLILITER(S): 213 SOLUTION TOPICAL at 05:25

## 2022-02-04 RX ADMIN — Medication 6 MILLIGRAM(S): at 11:53

## 2022-02-04 RX ADMIN — FAMOTIDINE 20 MILLIGRAM(S): 10 INJECTION INTRAVENOUS at 05:25

## 2022-02-04 RX ADMIN — Medication 50 MILLIGRAM(S): at 23:54

## 2022-02-04 RX ADMIN — Medication 6 UNIT(S): at 23:55

## 2022-02-04 RX ADMIN — Medication 6 MILLIGRAM(S): at 17:17

## 2022-02-04 RX ADMIN — SODIUM CHLORIDE 75 MILLILITER(S): 9 INJECTION, SOLUTION INTRAVENOUS at 17:17

## 2022-02-04 RX ADMIN — Medication 6 MILLIGRAM(S): at 05:25

## 2022-02-04 RX ADMIN — Medication 2: at 11:44

## 2022-02-04 RX ADMIN — ENOXAPARIN SODIUM 40 MILLIGRAM(S): 100 INJECTION SUBCUTANEOUS at 11:48

## 2022-02-04 RX ADMIN — MIDAZOLAM HYDROCHLORIDE 2 MILLIGRAM(S): 1 INJECTION, SOLUTION INTRAMUSCULAR; INTRAVENOUS at 01:05

## 2022-02-04 RX ADMIN — FENTANYL CITRATE 4 MICROGRAM(S)/KG/HR: 50 INJECTION INTRAVENOUS at 08:48

## 2022-02-04 RX ADMIN — Medication 2: at 23:55

## 2022-02-04 RX ADMIN — MEROPENEM 200 MILLIGRAM(S): 1 INJECTION INTRAVENOUS at 23:53

## 2022-02-04 RX ADMIN — MEROPENEM 200 MILLIGRAM(S): 1 INJECTION INTRAVENOUS at 05:27

## 2022-02-04 RX ADMIN — MIDAZOLAM HYDROCHLORIDE 1.95 MG/KG/HR: 1 INJECTION, SOLUTION INTRAMUSCULAR; INTRAVENOUS at 09:27

## 2022-02-04 RX ADMIN — FENTANYL CITRATE 4 MICROGRAM(S)/KG/HR: 50 INJECTION INTRAVENOUS at 01:40

## 2022-02-04 RX ADMIN — FENTANYL CITRATE 4 MICROGRAM(S)/KG/HR: 50 INJECTION INTRAVENOUS at 16:47

## 2022-02-04 RX ADMIN — MEROPENEM 200 MILLIGRAM(S): 1 INJECTION INTRAVENOUS at 15:28

## 2022-02-04 RX ADMIN — INSULIN GLARGINE 25 UNIT(S): 100 INJECTION, SOLUTION SUBCUTANEOUS at 23:56

## 2022-02-04 RX ADMIN — Medication 50 MILLIGRAM(S): at 05:25

## 2022-02-04 RX ADMIN — Medication 50 MILLIGRAM(S): at 17:17

## 2022-02-04 RX ADMIN — CHLORHEXIDINE GLUCONATE 1 APPLICATION(S): 213 SOLUTION TOPICAL at 05:26

## 2022-02-04 RX ADMIN — Medication 50 MILLIGRAM(S): at 09:41

## 2022-02-04 RX ADMIN — Medication 3 UNIT(S): at 05:26

## 2022-02-04 RX ADMIN — FAMOTIDINE 20 MILLIGRAM(S): 10 INJECTION INTRAVENOUS at 17:19

## 2022-02-04 RX ADMIN — CHLORHEXIDINE GLUCONATE 15 MILLILITER(S): 213 SOLUTION TOPICAL at 17:19

## 2022-02-04 RX ADMIN — Medication 4: at 05:26

## 2022-02-04 RX ADMIN — MIDAZOLAM HYDROCHLORIDE 2 MILLIGRAM(S): 1 INJECTION, SOLUTION INTRAMUSCULAR; INTRAVENOUS at 03:11

## 2022-02-04 RX ADMIN — Medication 6 MILLIGRAM(S): at 23:53

## 2022-02-04 RX ADMIN — PROPOFOL 4.8 MICROGRAM(S)/KG/MIN: 10 INJECTION, EMULSION INTRAVENOUS at 08:48

## 2022-02-04 RX ADMIN — MIDAZOLAM HYDROCHLORIDE 1.95 MG/KG/HR: 1 INJECTION, SOLUTION INTRAMUSCULAR; INTRAVENOUS at 16:47

## 2022-02-04 NOTE — PROVIDER CONTACT NOTE (OTHER) - ASSESSMENT
Peripheral IV access with levophed running through it does not have blood return and bruising discoloration noted distal to site.

## 2022-02-04 NOTE — CONSULT NOTE ADULT - SUBJECTIVE AND OBJECTIVE BOX
71F presented with acute tongue swelling/SOB. emergent NT intubation by anesthesia. ENT consulted for evaluation. Pt was on ACE-I. CT reviewed - no abscess.    PMHx Htn/Asthma  All: now lisinopril.    ROS : unable to be obtained/Chart.    Gen: Sedated/intubated  Nose: NT tube right nares. no ulcerations.  OC: Moderate Edema of tongue. FOM soft. Oropharynx. mild edema.  Neck: Supple

## 2022-02-04 NOTE — PROGRESS NOTE ADULT - SUBJECTIVE AND OBJECTIVE BOX
Patient is a 71y old  Female who presents with a chief complaint of Angioedema (03 Feb 2022 04:41)    BRIEF HOSPITAL COURSE:   71F w/ PMHx HTN, asthma, B/L mastectomy presented on 02/03 w/ acute onset swelling of her tongue and base of mouth. Emergently taken to the OR and underwent nasotracheal intubation by anesthesiology w/ ACS standby     Events last 24 hours:   Remains nasotracheally intubated. AC/VC 40%/5+. Sedated w/ propofol and fentanyl and on low dose pressors   Swelling seems to have mildly improved     PAST MEDICAL & SURGICAL HISTORY:  Asthma    Diabetes    Review of Systems:  Unable to assess given sedation       Physical Examination:    ICU Vital Signs Last 24 Hrs  T(C): 36.2 (04 Feb 2022 01:15), Max: 36.9 (03 Feb 2022 13:00)  T(F): 97.2 (04 Feb 2022 01:15), Max: 98.4 (03 Feb 2022 13:00)  HR: 60 (04 Feb 2022 01:15) (56 - 119)  BP: 115/50 (04 Feb 2022 01:15) (85/57 - 203/117)  BP(mean): 70 (04 Feb 2022 01:15) (57 - 139)  ABP: --  ABP(mean): --  RR: 16 (04 Feb 2022 01:15) (14 - 18)  SpO2: 99% (04 Feb 2022 01:15) (94% - 100%)      Neuro: Sedated and could not perform neuro exam   HEENT: Pupils equal, reactive to light, Macroglossia and diffuse submandibular swelling, indurated   PULM: Clear to auscultation bilaterally  CVS: Regular rhythm and controlled rate  ABD: Soft, nondistended  EXT: No b/l LE edema  SKIN: Warm and well perfused, no acute rashes       Medications:  meropenem  IVPB 2000 milliGRAM(s) IV Intermittent every 8 hours  meropenem  IVPB        norepinephrine Infusion 0.05 MICROgram(s)/kG/Min IV Continuous <Continuous>    diphenhydrAMINE Injectable 50 milliGRAM(s) IV Push every 6 hours    fentaNYL   Infusion. 0.5 MICROgram(s)/kG/Hr IV Continuous <Continuous>  midazolam Injectable 2 milliGRAM(s) IV Push every 2 hours PRN  propofol Infusion 10 MICROgram(s)/kG/Min IV Continuous <Continuous>      enoxaparin Injectable 40 milliGRAM(s) SubCutaneous daily    famotidine Injectable 20 milliGRAM(s) IV Push every 12 hours      dexAMETHasone  Injectable 6 milliGRAM(s) IV Push every 6 hours  dextrose 40% Gel 15 Gram(s) Oral once  dextrose 50% Injectable 25 Gram(s) IV Push once  dextrose 50% Injectable 12.5 Gram(s) IV Push once  dextrose 50% Injectable 25 Gram(s) IV Push once  glucagon  Injectable 1 milliGRAM(s) IntraMuscular once  insulin glargine Injectable (LANTUS) 15 Unit(s) SubCutaneous at bedtime  insulin lispro (ADMELOG) corrective regimen sliding scale   SubCutaneous every 6 hours  insulin lispro Injectable (ADMELOG) 3 Unit(s) SubCutaneous every 6 hours    dextrose 5%. 1000 milliLiter(s) IV Continuous <Continuous>  dextrose 5%. 1000 milliLiter(s) IV Continuous <Continuous>      chlorhexidine 0.12% Liquid 15 milliLiter(s) Oral Mucosa every 12 hours  chlorhexidine 2% Cloths 1 Application(s) Topical <User Schedule>        Mode: AC/ CMV (Assist Control/ Continuous Mandatory Ventilation)  RR (machine): 16  TV (machine): 450  FiO2: 50  PEEP: 5  ITime: 0.9  MAP: 9  PIP: 22      I&O's Detail    02 Feb 2022 07:01  -  03 Feb 2022 07:00  --------------------------------------------------------  IN:    FentaNYL: 33.9 mL    IV PiggyBack: 200 mL    Norepinephrine: 10.8 mL    Plasma: 609 mL    Propofol: 87.9 mL  Total IN: 941.6 mL    OUT:    Indwelling Catheter - Urethral (mL): 190 mL  Total OUT: 190 mL    Total NET: 751.6 mL      03 Feb 2022 07:01  -  04 Feb 2022 01:22  --------------------------------------------------------  IN:    FentaNYL: 398.8 mL    IV PiggyBack: 100 mL    IV PiggyBack: 100 mL    Norepinephrine: 74.8 mL    Propofol: 394.2 mL  Total IN: 1067.8 mL    OUT:    Indwelling Catheter - Urethral (mL): 1935 mL  Total OUT: 1935 mL    Total NET: -867.2 mL            RADIOLOGY/ Microbiology/ Labs: reviewed

## 2022-02-05 LAB
ANION GAP SERPL CALC-SCNC: 12 MMOL/L — SIGNIFICANT CHANGE UP (ref 5–17)
BUN SERPL-MCNC: 20.9 MG/DL — HIGH (ref 8–20)
CALCIUM SERPL-MCNC: 8 MG/DL — LOW (ref 8.6–10.2)
CHLORIDE SERPL-SCNC: 110 MMOL/L — HIGH (ref 98–107)
CO2 SERPL-SCNC: 24 MMOL/L — SIGNIFICANT CHANGE UP (ref 22–29)
CREAT SERPL-MCNC: 0.6 MG/DL — SIGNIFICANT CHANGE UP (ref 0.5–1.3)
GAS PNL BLDA: SIGNIFICANT CHANGE UP
GLUCOSE BLDC GLUCOMTR-MCNC: 127 MG/DL — HIGH (ref 70–99)
GLUCOSE BLDC GLUCOMTR-MCNC: 132 MG/DL — HIGH (ref 70–99)
GLUCOSE BLDC GLUCOMTR-MCNC: 134 MG/DL — HIGH (ref 70–99)
GLUCOSE BLDC GLUCOMTR-MCNC: 165 MG/DL — HIGH (ref 70–99)
GLUCOSE SERPL-MCNC: 185 MG/DL — HIGH (ref 70–99)
HCT VFR BLD CALC: 35.3 % — SIGNIFICANT CHANGE UP (ref 34.5–45)
HGB BLD-MCNC: 11.3 G/DL — LOW (ref 11.5–15.5)
MAGNESIUM SERPL-MCNC: 2.2 MG/DL — SIGNIFICANT CHANGE UP (ref 1.6–2.6)
MCHC RBC-ENTMCNC: 31.4 PG — SIGNIFICANT CHANGE UP (ref 27–34)
MCHC RBC-ENTMCNC: 32 GM/DL — SIGNIFICANT CHANGE UP (ref 32–36)
MCV RBC AUTO: 98.1 FL — SIGNIFICANT CHANGE UP (ref 80–100)
PHOSPHATE SERPL-MCNC: 3.1 MG/DL — SIGNIFICANT CHANGE UP (ref 2.4–4.7)
PLATELET # BLD AUTO: 96 K/UL — LOW (ref 150–400)
POTASSIUM SERPL-MCNC: 4.1 MMOL/L — SIGNIFICANT CHANGE UP (ref 3.5–5.3)
POTASSIUM SERPL-SCNC: 4.1 MMOL/L — SIGNIFICANT CHANGE UP (ref 3.5–5.3)
RBC # BLD: 3.6 M/UL — LOW (ref 3.8–5.2)
RBC # FLD: 13.6 % — SIGNIFICANT CHANGE UP (ref 10.3–14.5)
SODIUM SERPL-SCNC: 146 MMOL/L — HIGH (ref 135–145)
WBC # BLD: 15.22 K/UL — HIGH (ref 3.8–10.5)
WBC # FLD AUTO: 15.22 K/UL — HIGH (ref 3.8–10.5)

## 2022-02-05 PROCEDURE — 99233 SBSQ HOSP IP/OBS HIGH 50: CPT

## 2022-02-05 PROCEDURE — 95720 EEG PHY/QHP EA INCR W/VEEG: CPT

## 2022-02-05 RX ADMIN — Medication 50 MILLIGRAM(S): at 16:30

## 2022-02-05 RX ADMIN — INSULIN GLARGINE 25 UNIT(S): 100 INJECTION, SOLUTION SUBCUTANEOUS at 23:44

## 2022-02-05 RX ADMIN — FENTANYL CITRATE 4 MICROGRAM(S)/KG/HR: 50 INJECTION INTRAVENOUS at 17:28

## 2022-02-05 RX ADMIN — MIDAZOLAM HYDROCHLORIDE 1.95 MG/KG/HR: 1 INJECTION, SOLUTION INTRAMUSCULAR; INTRAVENOUS at 04:10

## 2022-02-05 RX ADMIN — Medication 50 MILLIGRAM(S): at 06:15

## 2022-02-05 RX ADMIN — FENTANYL CITRATE 4 MICROGRAM(S)/KG/HR: 50 INJECTION INTRAVENOUS at 09:48

## 2022-02-05 RX ADMIN — MEROPENEM 200 MILLIGRAM(S): 1 INJECTION INTRAVENOUS at 13:03

## 2022-02-05 RX ADMIN — CHLORHEXIDINE GLUCONATE 1 APPLICATION(S): 213 SOLUTION TOPICAL at 06:45

## 2022-02-05 RX ADMIN — Medication 50 MILLIGRAM(S): at 09:46

## 2022-02-05 RX ADMIN — ENOXAPARIN SODIUM 40 MILLIGRAM(S): 100 INJECTION SUBCUTANEOUS at 11:17

## 2022-02-05 RX ADMIN — Medication 50 MILLIGRAM(S): at 23:34

## 2022-02-05 RX ADMIN — SODIUM CHLORIDE 75 MILLILITER(S): 9 INJECTION, SOLUTION INTRAVENOUS at 09:47

## 2022-02-05 RX ADMIN — Medication 6 UNIT(S): at 06:16

## 2022-02-05 RX ADMIN — FAMOTIDINE 20 MILLIGRAM(S): 10 INJECTION INTRAVENOUS at 17:27

## 2022-02-05 RX ADMIN — Medication 6 MILLIGRAM(S): at 11:19

## 2022-02-05 RX ADMIN — MEROPENEM 200 MILLIGRAM(S): 1 INJECTION INTRAVENOUS at 23:35

## 2022-02-05 RX ADMIN — CHLORHEXIDINE GLUCONATE 15 MILLILITER(S): 213 SOLUTION TOPICAL at 06:09

## 2022-02-05 RX ADMIN — FAMOTIDINE 20 MILLIGRAM(S): 10 INJECTION INTRAVENOUS at 06:16

## 2022-02-05 RX ADMIN — Medication 6 UNIT(S): at 17:26

## 2022-02-05 RX ADMIN — Medication 2: at 17:22

## 2022-02-05 RX ADMIN — Medication 7.5 MICROGRAM(S)/KG/MIN: at 09:47

## 2022-02-05 RX ADMIN — MEROPENEM 200 MILLIGRAM(S): 1 INJECTION INTRAVENOUS at 06:45

## 2022-02-05 RX ADMIN — Medication 6 MILLIGRAM(S): at 17:25

## 2022-02-05 RX ADMIN — Medication 6 MILLIGRAM(S): at 06:15

## 2022-02-05 RX ADMIN — SODIUM CHLORIDE 75 MILLILITER(S): 9 INJECTION, SOLUTION INTRAVENOUS at 23:34

## 2022-02-05 RX ADMIN — Medication 6 UNIT(S): at 11:17

## 2022-02-05 RX ADMIN — Medication 6 UNIT(S): at 23:44

## 2022-02-05 RX ADMIN — CHLORHEXIDINE GLUCONATE 15 MILLILITER(S): 213 SOLUTION TOPICAL at 17:21

## 2022-02-05 RX ADMIN — FENTANYL CITRATE 4 MICROGRAM(S)/KG/HR: 50 INJECTION INTRAVENOUS at 01:29

## 2022-02-05 RX ADMIN — Medication 6 MILLIGRAM(S): at 23:35

## 2022-02-05 NOTE — EEG REPORT - NS EEG TEXT BOX
[facility]   COMPREHENSIVE EPILEPSY CENTER   REPORT OF LONG-TERM VIDEO EEG     [facility]: [address], [place] [address], Ph#: [phone]  [facility]:  [address] [place] [address], Ph#: [phone]  [facility]: [address] [place] [address], Ph#: [phone]    Patient Name: JIMENA LOPEZ  Age and : 71y ([birth year])  MRN #: [id number]  Location: [place] [id number]  Referring Physician: Kelsey Hernandez    Start Time/Date: 01:35 on 2022  End Time/Date: 08:00 on 2022  Duration: 06:23  _____________________________________________________________  STUDY INFORMATION    EEG Recording Technique:  The patient underwent continuous Video-EEG monitoring, using Telemetry System hardware on the XLTek Digital System. EEG and video data were stored on a computer hard drive with important events saved in digital archive files. The material was reviewed by a physician (electroencephalographer / epileptologist) on a daily basis. Mian and seizure detection algorithms were utilized and reviewed. An EEG Technician attended to the patient, and was available throughout daytime work hours.  The epilepsy center neurologist was available in person or on call 24-hours per day.    EEG Placement and Labeling of Electrodes:  The EEG was performed utilizing 20 channel referential EEG connections (coronal over temporal over parasagittal montage) using all standard 10-20 electrode placements with EKG, with additional electrodes placed in the inferior temporal region using the modified 10-10 montage electrode placements for elective admissions, or if deemed necessary. Recording was at a sampling rate of 256 samples per second per channel. Time synchronized digital video recording was done simultaneously with EEG recording. A low light infrared camera was used for low light recording.     _____________________________________________________________  HISTORY    Patient is a 71y old  Female who presents with a chief complaint of Angioedema (2022 00:45)      PERTINENT MEDICATION:  none  _____________________________________________________________  STUDY INTERPRETATION    Findings: The background was continuous, spontaneously variable and reactive. During wakefulness, No posterior dominant rhythm seen.    Background Slowing:  Diffuse theta and polymorphic delta slowing.    Focal Slowing:   None were present.    Sleep Background:  Stage II sleep transients were not recorded.    Other Non-Epileptiform Findings:  None were present.    Interictal Epileptiform Activity:   None were present.    Events:  Clinical events: None recorded.  Seizures: None recorded.    Activation Procedures:   Hyperventilation was not performed.    Photic stimulation was not performed.     Artifacts:  Intermittent myogenic and movement artifacts were noted.    ECG:  The heart rate on single channel ECG was predominantly between 50-60 BPM.    _____________________________________________________________  EEG SUMMARY/CLASSIFICATION    Abnormal EEG in an encephalopathic patient.  - Moderate generalized slowing.    _____________________________________________________________  EEG IMPRESSION/CLINICAL CORRELATE    Abnormal EEG study.  Moderate nonspecific diffuse or multifocal cerebral dysfunction.   No epileptiform pattern or seizure seen.    Lauro Oliver MD PGY-5  Epilepsy Fellow    This Preliminary report is based on fellow review. Final report pending attending review.    Reading Room: [phone]  On Call Service After Hours: [phone] [facility]   COMPREHENSIVE EPILEPSY CENTER   REPORT OF LONG-TERM VIDEO EEG     [facility]: [address], [place] [address], Ph#: [phone]  [facility]: [address] [place] [address], Ph#: [phone]  [facility]: [address] [place] [address], Ph#: [phone]    Patient Name: JIMENA LOPEZ  Age and : 71y (54)  MRN #: [id number]  Location: [place] [address]   Referring Physician: Kelsey Hernandez    Start Time/Date: 01:35 on 2022  End Time/Date: 08:00 on 2022  Duration: 06:23h  _____________________________________________________________  STUDY INFORMATION    EEG Recording Technique:  The patient underwent continuous Video-EEG monitoring, using Telemetry System hardware on the XLTek Digital System. EEG and video data were stored on a computer hard drive with important events saved in digital archive files. The material was reviewed by a physician (electroencephalographer / epileptologist) on a daily basis. Mian and seizure detection algorithms were utilized and reviewed. An EEG Technician attended to the patient, and was available throughout daytime work hours.  The epilepsy center neurologist was available in person or on call 24-hours per day.    EEG Placement and Labeling of Electrodes:  The EEG was performed utilizing 20 channel referential EEG connections (coronal over temporal over parasagittal montage) using all standard 10-20 electrode placements with EKG, with additional electrodes placed in the inferior temporal region using the modified 10-10 montage electrode placements for elective admissions, or if deemed necessary. Recording was at a sampling rate of 256 samples per second per channel. Time synchronized digital video recording was done simultaneously with EEG recording. A low light infrared camera was used for low light recording.     _____________________________________________________________  HISTORY : Patient is a 71y old  Female who presents with a chief complaint of Angioedema (2022 00:45)    PERTINENT MEDICATION: none  _____________________________________________________________  STUDY INTERPRETATION    Findings: The background was continuous, spontaneously variable and reactive. During wakefulness, No posterior dominant rhythm seen.    Background Slowing:  Diffuse theta and polymorphic delta slowing.    Focal Slowing:   None were present.    Sleep Background:  Stage II sleep transients were not recorded.    Other Non-Epileptiform Findings:  None were present.    Interictal Epileptiform Activity:   None were present.    Events:  Clinical events: None recorded.  Seizures: None recorded.    Activation Procedures:   Hyperventilation was not performed.    Photic stimulation was not performed.     Artifacts:  Intermittent myogenic and movement artifacts were noted.    ECG:  The heart rate on single channel ECG was predominantly between 50-60 BPM.    _____________________________________________________________  EEG SUMMARY/CLASSIFICATION    Abnormal EEG in an encephalopathic patient.  - Moderate generalized slowing.    _____________________________________________________________  EEG IMPRESSION/CLINICAL CORRELATE    Abnormal EEG study.  Moderate nonspecific diffuse or multifocal cerebral dysfunction.   No epileptiform pattern or seizure seen.    Lauro Oliver MD PGY-5  Epilepsy Fellow    Reading Room: [phone]  On Call Service After Hours: [phone]

## 2022-02-05 NOTE — DIETITIAN INITIAL EVALUATION ADULT. - PERTINENT LABORATORY DATA
02-05 Na146 mmol/L<H> Glu 185 mg/dL<H> K+ 4.1 mmol/L Cr  0.60 mg/dL BUN 20.9 mg/dL<H> Phos 3.1 mg/dL Alb n/a   PAB n/a

## 2022-02-05 NOTE — PROGRESS NOTE ADULT - SUBJECTIVE AND OBJECTIVE BOX
Patient is a 71y old  Female who presents with a chief complaint of Angioedema (03 Feb 2022 04:41)    BRIEF HOSPITAL COURSE:   71F w/ PMHx HTN, asthma, B/L mastectomy presented on 02/03 w/ acute onset swelling of her tongue and base of mouth. Emergently taken to the OR and underwent nasotracheal intubation by anesthesiology w/ ACS standby     Events last 24 hours:   Remains nasotracheally intubated. AC/VC 40%/5+. Sedated w/ versed and fentanyl and on low dose pressors   Swelling mildly improved  Seen by ENT late last night, Cristian's unlikely     PAST MEDICAL & SURGICAL HISTORY:  Asthma    Diabetes    Review of Systems:  Unable to assess given sedation       Physical Examination:  ICU Vital Signs Last 24 Hrs  T(C): 37 (05 Feb 2022 00:00), Max: 37 (04 Feb 2022 17:00)  T(F): 98.6 (05 Feb 2022 00:00), Max: 98.6 (04 Feb 2022 17:00)  HR: 60 (05 Feb 2022 00:00) (54 - 87)  BP: 115/60 (05 Feb 2022 00:00) (81/39 - 145/59)  BP(mean): 75 (05 Feb 2022 00:00) (52 - 98)  ABP: --  ABP(mean): --  RR: 16 (05 Feb 2022 00:00) (16 - 18)  SpO2: 100% (05 Feb 2022 00:00) (97% - 100%)      Neuro: Sedated and could not perform neuro exam   HEENT: Pupils equal, reactive to light, Macroglossia and diffuse submandibular swelling  PULM: Clear to auscultation bilaterally  CVS: Regular rhythm and controlled rate  ABD: Soft, nondistended  EXT: No b/l LE edema  SKIN: Warm and well perfused, no acute rashes     MEDICATIONS  (STANDING):  chlorhexidine 0.12% Liquid 15 milliLiter(s) Oral Mucosa every 12 hours  chlorhexidine 2% Cloths 1 Application(s) Topical <User Schedule>  dexAMETHasone  Injectable 6 milliGRAM(s) IV Push every 6 hours  dextrose 40% Gel 15 Gram(s) Oral once  dextrose 5%. 1000 milliLiter(s) (50 mL/Hr) IV Continuous <Continuous>  dextrose 5%. 1000 milliLiter(s) (100 mL/Hr) IV Continuous <Continuous>  dextrose 50% Injectable 25 Gram(s) IV Push once  dextrose 50% Injectable 12.5 Gram(s) IV Push once  dextrose 50% Injectable 25 Gram(s) IV Push once  diphenhydrAMINE Injectable 50 milliGRAM(s) IV Push every 6 hours  enoxaparin Injectable 40 milliGRAM(s) SubCutaneous daily  famotidine Injectable 20 milliGRAM(s) IV Push every 12 hours  fentaNYL   Infusion. 0.5 MICROgram(s)/kG/Hr (4 mL/Hr) IV Continuous <Continuous>  glucagon  Injectable 1 milliGRAM(s) IntraMuscular once  insulin glargine Injectable (LANTUS) 25 Unit(s) SubCutaneous at bedtime  insulin lispro (ADMELOG) corrective regimen sliding scale   SubCutaneous every 6 hours  insulin lispro Injectable (ADMELOG) 6 Unit(s) SubCutaneous every 6 hours  lactated ringers. 1000 milliLiter(s) (75 mL/Hr) IV Continuous <Continuous>  meropenem  IVPB      meropenem  IVPB 2000 milliGRAM(s) IV Intermittent every 8 hours  midazolam Infusion 0.02 mG/kG/Hr (1.95 mL/Hr) IV Continuous <Continuous>  norepinephrine Infusion 0.05 MICROgram(s)/kG/Min (7.5 mL/Hr) IV Continuous <Continuous>    MEDICATIONS  (PRN):  midazolam Injectable 2 milliGRAM(s) IV Push every 2 hours PRN Agitation      Mode: AC/ CMV (Assist Control/ Continuous Mandatory Ventilation)  RR (machine): 16  TV (machine): 450  FiO2: 40  PEEP: 5  ITime: 0.9  MAP: 9  PIP: 22      I&O's Summary    03 Feb 2022 07:01  -  04 Feb 2022 07:00  --------------------------------------------------------  IN: 1725.5 mL / OUT: 2160 mL / NET: -434.5 mL    04 Feb 2022 07:01  -  05 Feb 2022 00:48  --------------------------------------------------------  IN: 2273.1 mL / OUT: 660 mL / NET: 1613.1 mL      RADIOLOGY/ Microbiology/ Labs: reviewed

## 2022-02-05 NOTE — DIETITIAN INITIAL EVALUATION ADULT. - OTHER INFO
71F w/ PMHx HTN, asthma, B/L mastectomy presented on 02/03 w/ acute onset swelling of her tongue and base of mouth. Emergently taken to the OR and underwent nasotracheal intubation by anesthesiology. Pt remains intubated, NPO at this time. Aware of no feeding access, noted will need to attempt Dobbhoff placement when swelling improves- noted mildly improved overnight. RD to follow up.

## 2022-02-05 NOTE — DIETITIAN INITIAL EVALUATION ADULT. - PERTINENT MEDS FT
MEDICATIONS  (STANDING):  chlorhexidine 0.12% Liquid 15 milliLiter(s) Oral Mucosa every 12 hours  chlorhexidine 2% Cloths 1 Application(s) Topical <User Schedule>  dexAMETHasone  Injectable 6 milliGRAM(s) IV Push every 6 hours  dextrose 40% Gel 15 Gram(s) Oral once  dextrose 5%. 1000 milliLiter(s) (50 mL/Hr) IV Continuous <Continuous>  dextrose 5%. 1000 milliLiter(s) (100 mL/Hr) IV Continuous <Continuous>  dextrose 50% Injectable 25 Gram(s) IV Push once  dextrose 50% Injectable 12.5 Gram(s) IV Push once  dextrose 50% Injectable 25 Gram(s) IV Push once  diphenhydrAMINE Injectable 50 milliGRAM(s) IV Push every 6 hours  enoxaparin Injectable 40 milliGRAM(s) SubCutaneous daily  famotidine Injectable 20 milliGRAM(s) IV Push every 12 hours  fentaNYL   Infusion. 0.5 MICROgram(s)/kG/Hr (4 mL/Hr) IV Continuous <Continuous>  glucagon  Injectable 1 milliGRAM(s) IntraMuscular once  insulin glargine Injectable (LANTUS) 25 Unit(s) SubCutaneous at bedtime  insulin lispro (ADMELOG) corrective regimen sliding scale   SubCutaneous every 6 hours  insulin lispro Injectable (ADMELOG) 6 Unit(s) SubCutaneous every 6 hours  lactated ringers. 1000 milliLiter(s) (75 mL/Hr) IV Continuous <Continuous>  meropenem  IVPB      meropenem  IVPB 2000 milliGRAM(s) IV Intermittent every 8 hours  midazolam Infusion 0.02 mG/kG/Hr (1.95 mL/Hr) IV Continuous <Continuous>  norepinephrine Infusion 0.05 MICROgram(s)/kG/Min (7.5 mL/Hr) IV Continuous <Continuous>    MEDICATIONS  (PRN):  midazolam Injectable 2 milliGRAM(s) IV Push every 2 hours PRN Agitation

## 2022-02-05 NOTE — DIETITIAN INITIAL EVALUATION ADULT. - ETIOLOGY
related to inability to meet sufficient protein-energy in setting of acute onset swelling of tongue/mouth and acute hypoxic respiratory failure

## 2022-02-06 LAB
ANION GAP SERPL CALC-SCNC: 12 MMOL/L — SIGNIFICANT CHANGE UP (ref 5–17)
BUN SERPL-MCNC: 21.9 MG/DL — HIGH (ref 8–20)
CALCIUM SERPL-MCNC: 7.6 MG/DL — LOW (ref 8.6–10.2)
CHLORIDE SERPL-SCNC: 109 MMOL/L — HIGH (ref 98–107)
CO2 SERPL-SCNC: 24 MMOL/L — SIGNIFICANT CHANGE UP (ref 22–29)
CREAT SERPL-MCNC: 0.59 MG/DL — SIGNIFICANT CHANGE UP (ref 0.5–1.3)
GAS PNL BLDA: SIGNIFICANT CHANGE UP
GLUCOSE BLDC GLUCOMTR-MCNC: 125 MG/DL — HIGH (ref 70–99)
GLUCOSE BLDC GLUCOMTR-MCNC: 129 MG/DL — HIGH (ref 70–99)
GLUCOSE BLDC GLUCOMTR-MCNC: 139 MG/DL — HIGH (ref 70–99)
GLUCOSE SERPL-MCNC: 157 MG/DL — HIGH (ref 70–99)
HCT VFR BLD CALC: 34.3 % — LOW (ref 34.5–45)
HGB BLD-MCNC: 11.2 G/DL — LOW (ref 11.5–15.5)
MAGNESIUM SERPL-MCNC: 2.4 MG/DL — SIGNIFICANT CHANGE UP (ref 1.6–2.6)
MCHC RBC-ENTMCNC: 31.5 PG — SIGNIFICANT CHANGE UP (ref 27–34)
MCHC RBC-ENTMCNC: 32.7 GM/DL — SIGNIFICANT CHANGE UP (ref 32–36)
MCV RBC AUTO: 96.3 FL — SIGNIFICANT CHANGE UP (ref 80–100)
PHOSPHATE SERPL-MCNC: 3.4 MG/DL — SIGNIFICANT CHANGE UP (ref 2.4–4.7)
PLATELET # BLD AUTO: 151 K/UL — SIGNIFICANT CHANGE UP (ref 150–400)
POTASSIUM SERPL-MCNC: 3.9 MMOL/L — SIGNIFICANT CHANGE UP (ref 3.5–5.3)
POTASSIUM SERPL-SCNC: 3.9 MMOL/L — SIGNIFICANT CHANGE UP (ref 3.5–5.3)
RBC # BLD: 3.56 M/UL — LOW (ref 3.8–5.2)
RBC # FLD: 13.3 % — SIGNIFICANT CHANGE UP (ref 10.3–14.5)
SODIUM SERPL-SCNC: 145 MMOL/L — SIGNIFICANT CHANGE UP (ref 135–145)
WBC # BLD: 11.06 K/UL — HIGH (ref 3.8–10.5)
WBC # FLD AUTO: 11.06 K/UL — HIGH (ref 3.8–10.5)

## 2022-02-06 PROCEDURE — 95718 EEG PHYS/QHP 2-12 HR W/VEEG: CPT

## 2022-02-06 PROCEDURE — 99233 SBSQ HOSP IP/OBS HIGH 50: CPT

## 2022-02-06 RX ADMIN — MIDAZOLAM HYDROCHLORIDE 1.95 MG/KG/HR: 1 INJECTION, SOLUTION INTRAMUSCULAR; INTRAVENOUS at 00:39

## 2022-02-06 RX ADMIN — Medication 6 MILLIGRAM(S): at 06:18

## 2022-02-06 RX ADMIN — Medication 6 MILLIGRAM(S): at 17:41

## 2022-02-06 RX ADMIN — CHLORHEXIDINE GLUCONATE 1 APPLICATION(S): 213 SOLUTION TOPICAL at 06:19

## 2022-02-06 RX ADMIN — Medication 0: at 18:14

## 2022-02-06 RX ADMIN — Medication 4 MILLIGRAM(S): at 11:21

## 2022-02-06 RX ADMIN — MEROPENEM 200 MILLIGRAM(S): 1 INJECTION INTRAVENOUS at 06:19

## 2022-02-06 RX ADMIN — MEROPENEM 200 MILLIGRAM(S): 1 INJECTION INTRAVENOUS at 17:14

## 2022-02-06 RX ADMIN — FAMOTIDINE 20 MILLIGRAM(S): 10 INJECTION INTRAVENOUS at 06:18

## 2022-02-06 RX ADMIN — Medication 50 MILLIGRAM(S): at 10:37

## 2022-02-06 RX ADMIN — ENOXAPARIN SODIUM 40 MILLIGRAM(S): 100 INJECTION SUBCUTANEOUS at 11:23

## 2022-02-06 RX ADMIN — Medication 50 MILLIGRAM(S): at 17:41

## 2022-02-06 RX ADMIN — FENTANYL CITRATE 4 MICROGRAM(S)/KG/HR: 50 INJECTION INTRAVENOUS at 00:39

## 2022-02-06 RX ADMIN — CHLORHEXIDINE GLUCONATE 15 MILLILITER(S): 213 SOLUTION TOPICAL at 17:40

## 2022-02-06 RX ADMIN — Medication 50 MILLIGRAM(S): at 06:18

## 2022-02-06 RX ADMIN — FAMOTIDINE 20 MILLIGRAM(S): 10 INJECTION INTRAVENOUS at 17:41

## 2022-02-06 RX ADMIN — MEROPENEM 200 MILLIGRAM(S): 1 INJECTION INTRAVENOUS at 22:48

## 2022-02-06 RX ADMIN — Medication 6 UNIT(S): at 18:20

## 2022-02-06 RX ADMIN — FENTANYL CITRATE 4 MICROGRAM(S)/KG/HR: 50 INJECTION INTRAVENOUS at 10:50

## 2022-02-06 RX ADMIN — Medication 50 MILLIGRAM(S): at 22:48

## 2022-02-06 RX ADMIN — Medication 6 UNIT(S): at 06:18

## 2022-02-06 RX ADMIN — CHLORHEXIDINE GLUCONATE 15 MILLILITER(S): 213 SOLUTION TOPICAL at 06:19

## 2022-02-06 RX ADMIN — MIDAZOLAM HYDROCHLORIDE 1.95 MG/KG/HR: 1 INJECTION, SOLUTION INTRAMUSCULAR; INTRAVENOUS at 19:34

## 2022-02-06 RX ADMIN — FENTANYL CITRATE 4 MICROGRAM(S)/KG/HR: 50 INJECTION INTRAVENOUS at 19:35

## 2022-02-06 NOTE — PROGRESS NOTE ADULT - SUBJECTIVE AND OBJECTIVE BOX
Improving tongue edema.  FOM soft. able to visualize oropharynx. Nasal ETT right nares. no ulcerations.    a/p Angioedema of tongue. slowly improving.    Maintain ETT until leak test positive then may attempt extubation.  Meds per ICU staff.

## 2022-02-06 NOTE — CHART NOTE - NSCHARTNOTEFT_GEN_A_CORE
Spoke w/ pt's daughter, Giovanna, at bedside. Updated on patient's condition. All questions/concerns answered.

## 2022-02-06 NOTE — EEG REPORT - NS EEG TEXT BOX
[facility]   COMPREHENSIVE EPILEPSY CENTER   REPORT OF LONG-TERM VIDEO EEG     [facility]: [address], [place] [address], Ph#: [phone]  [facility]: [address] [place] [address], Ph#: [phone]  [facility]: [address] [place] [address], Ph#: [phone]    Patient Name: JIMENA LOPEZ  Age and : 71y (54)  MRN #: [id number]  Location: [address] [address]  Referring Physician: Kelsey Hernandez    Start Time/Date: 08:00 on 2022  End Time/Date: 19:20 on 2022  Duration: 11h 20m  _____________________________________________________________  STUDY INFORMATION    EEG Recording Technique:  The patient underwent continuous Video-EEG monitoring, using Telemetry System hardware on the XLTek Digital System. EEG and video data were stored on a computer hard drive with important events saved in digital archive files. The material was reviewed by a physician (electroencephalographer / epileptologist) on a daily basis. Mian and seizure detection algorithms were utilized and reviewed. An EEG Technician attended to the patient, and was available throughout daytime work hours.  The epilepsy center neurologist was available in person or on call 24-hours per day.    EEG Placement and Labeling of Electrodes:  The EEG was performed utilizing 20 channel referential EEG connections (coronal over temporal over parasagittal montage) using all standard 10-20 electrode placements with EKG, with additional electrodes placed in the inferior temporal region using the modified 10-10 montage electrode placements for elective admissions, or if deemed necessary. Recording was at a sampling rate of 256 samples per second per channel. Time synchronized digital video recording was done simultaneously with EEG recording. A low light infrared camera was used for low light recording.     _____________________________________________________________  HISTORY : Patient is a 71y old  Female who presents with a chief complaint of Angioedema (2022 00:45)    PERTINENT MEDICATION: none  _____________________________________________________________  STUDY INTERPRETATION    Findings: The background was continuous, spontaneously variable and reactive. During wakefulness, No posterior dominant rhythm seen.    Background Slowing:  Diffuse theta and polymorphic delta slowing.    Focal Slowing:   None were present.    Sleep Background:  Stage II sleep transients were not recorded.    Other Non-Epileptiform Findings:  None were present.    Interictal Epileptiform Activity:   None were present.    Events:  Clinical events: None recorded.  Seizures: None recorded.    Activation Procedures:   Hyperventilation was not performed.    Photic stimulation was not performed.     Artifacts:  Intermittent myogenic and movement artifacts were noted.    ECG:  The heart rate on single channel ECG was predominantly between 50-60 BPM.    _____________________________________________________________  EEG SUMMARY/CLASSIFICATION    Abnormal EEG in an encephalopathic patient.  - Moderate to severe generalized slowing.    _____________________________________________________________  EEG IMPRESSION/CLINICAL CORRELATE    Abnormal EEG study.  1. Moderate to severe nonspecific diffuse or multifocal cerebral dysfunction.   2. No epileptiform pattern or seizure seen.    ______________________  Andre Lott MD  Director, Epilepsy/EMU - [facility]

## 2022-02-06 NOTE — PROGRESS NOTE ADULT - SUBJECTIVE AND OBJECTIVE BOX
Patient is a 71y old  Female who presents with a chief complaint of Angioedema (03 Feb 2022 04:41)    BRIEF HOSPITAL COURSE:   71F w/ PMHx HTN, asthma, B/L mastectomy presented on 02/03 w/ acute onset swelling of her tongue and base of mouth. Emergently taken to the OR and underwent nasotracheal intubation by anesthesiology w/ ACS standby     Events last 24 hours:   Remains nasotracheally intubated. AC/VC 40%/5+. Sedated w/ versed and fentanyl and on low dose pressors   Swelling continues to subside     PAST MEDICAL & SURGICAL HISTORY:  Asthma    Diabetes    Review of Systems:  Unable to assess given sedation       Physical Examination:  ICU Vital Signs Last 24 Hrs  T(C): 36.8 (05 Feb 2022 23:18), Max: 37.2 (05 Feb 2022 03:00)  T(F): 98.2 (05 Feb 2022 23:18), Max: 99 (05 Feb 2022 03:00)  HR: 57 (05 Feb 2022 23:00) (52 - 66)  BP: 123/61 (05 Feb 2022 23:00) (87/50 - 180/66)  BP(mean): 79 (05 Feb 2022 23:00) (58 - 97)  ABP: --  ABP(mean): --  RR: 16 (05 Feb 2022 23:00) (16 - 17)  SpO2: 99% (05 Feb 2022 23:00) (88% - 100%)      Neuro: Sedated and could not perform neuro exam   HEENT: Pupils equal, reactive to light, Tongue swelling (decreasing), submandibular swelling also resolving  PULM: Clear to auscultation bilaterally  CVS: Regular rhythm and controlled rate  ABD: Soft, nondistended  EXT: No b/l LE edema  SKIN: Warm and well perfused, no acute rashes       MEDICATIONS  (STANDING):  chlorhexidine 0.12% Liquid 15 milliLiter(s) Oral Mucosa every 12 hours  chlorhexidine 2% Cloths 1 Application(s) Topical <User Schedule>  dexAMETHasone  Injectable 6 milliGRAM(s) IV Push every 6 hours  dextrose 40% Gel 15 Gram(s) Oral once  dextrose 5%. 1000 milliLiter(s) (50 mL/Hr) IV Continuous <Continuous>  dextrose 5%. 1000 milliLiter(s) (100 mL/Hr) IV Continuous <Continuous>  dextrose 50% Injectable 25 Gram(s) IV Push once  dextrose 50% Injectable 12.5 Gram(s) IV Push once  dextrose 50% Injectable 25 Gram(s) IV Push once  diphenhydrAMINE Injectable 50 milliGRAM(s) IV Push every 6 hours  enoxaparin Injectable 40 milliGRAM(s) SubCutaneous daily  famotidine Injectable 20 milliGRAM(s) IV Push every 12 hours  fentaNYL   Infusion. 0.5 MICROgram(s)/kG/Hr (4 mL/Hr) IV Continuous <Continuous>  glucagon  Injectable 1 milliGRAM(s) IntraMuscular once  insulin glargine Injectable (LANTUS) 25 Unit(s) SubCutaneous at bedtime  insulin lispro (ADMELOG) corrective regimen sliding scale   SubCutaneous every 6 hours  insulin lispro Injectable (ADMELOG) 6 Unit(s) SubCutaneous every 6 hours  lactated ringers. 1000 milliLiter(s) (75 mL/Hr) IV Continuous <Continuous>  meropenem  IVPB      meropenem  IVPB 2000 milliGRAM(s) IV Intermittent every 8 hours  midazolam Infusion 0.02 mG/kG/Hr (1.95 mL/Hr) IV Continuous <Continuous>  norepinephrine Infusion 0.05 MICROgram(s)/kG/Min (7.5 mL/Hr) IV Continuous <Continuous>    MEDICATIONS  (PRN):  midazolam Injectable 2 milliGRAM(s) IV Push every 2 hours PRN Agitation      I&O's Summary    04 Feb 2022 07:01  -  05 Feb 2022 07:00  --------------------------------------------------------  IN: 3195.6 mL / OUT: 1000 mL / NET: 2195.6 mL    05 Feb 2022 07:01  -  06 Feb 2022 00:16  --------------------------------------------------------  IN: 1678.8 mL / OUT: 1035 mL / NET: 643.8 mL    Mode: AC/ CMV (Assist Control/ Continuous Mandatory Ventilation)  RR (machine): 16  TV (machine): 450  FiO2: 40  PEEP: 5  ITime: 0.9  MAP: 9  PIP: 23      RADIOLOGY/ Microbiology/ Labs: reviewed

## 2022-02-06 NOTE — EEG REPORT - NS EEG TEXT BOX
[facility]   COMPREHENSIVE EPILEPSY CENTER   REPORT OF LONG-TERM VIDEO EEG     [facility]: [address], [place] [address], Ph#: [phone]  [place]:  [address] [place] [address], Ph#: [phone]  [facility]: [address] [place] [address], Ph#: [phone]    Patient Name: JIMENA LOPEZ  Age and : 71y ([birth year])  MRN #: [id number]  Location: [place] [address]758   Referring Physician: Kelsey Hernandez    Start Time/Date: 08:00 on 2022  End Time/Date: 08:00 on 2022  Duration: 24:00  _____________________________________________________________  STUDY INFORMATION    EEG Recording Technique:  The patient underwent continuous Video-EEG monitoring, using Telemetry System hardware on the XLTek Digital System. EEG and video data were stored on a computer hard drive with important events saved in digital archive files. The material was reviewed by a physician (electroencephalographer / epileptologist) on a daily basis. Mian and seizure detection algorithms were utilized and reviewed. An EEG Technician attended to the patient, and was available throughout daytime work hours.  The epilepsy center neurologist was available in person or on call 24-hours per day.    EEG Placement and Labeling of Electrodes:  The EEG was performed utilizing 20 channel referential EEG connections (coronal over temporal over parasagittal montage) using all standard 10-20 electrode placements with EKG, with additional electrodes placed in the inferior temporal region using the modified 10-10 montage electrode placements for elective admissions, or if deemed necessary. Recording was at a sampling rate of 256 samples per second per channel. Time synchronized digital video recording was done simultaneously with EEG recording. A low light infrared camera was used for low light recording.     _____________________________________________________________  HISTORY : Patient is a 71y old  Female who presents with a chief complaint of Angioedema (2022 00:45)    PERTINENT MEDICATION: none  _____________________________________________________________  STUDY INTERPRETATION    Findings: The background was continuous, spontaneously variable and reactive. During wakefulness, No posterior dominant rhythm seen.    Background Slowing:  Diffuse theta and polymorphic delta slowing.    Focal Slowing:   None were present.    Sleep Background:  Stage II sleep transients were not recorded.    Other Non-Epileptiform Findings:  None were present.    Interictal Epileptiform Activity:   None were present.    Events:  Clinical events: None recorded.  Seizures: None recorded.    Activation Procedures:   Hyperventilation was not performed.    Photic stimulation was not performed.     Artifacts:  Intermittent myogenic and movement artifacts were noted.    ECG:  The heart rate on single channel ECG was predominantly between 50-60 BPM.    _____________________________________________________________  EEG SUMMARY/CLASSIFICATION    Abnormal EEG in an encephalopathic patient.  - Moderate generalized slowing.    _____________________________________________________________  EEG IMPRESSION/CLINICAL CORRELATE    Abnormal EEG study.  Moderate nonspecific diffuse or multifocal cerebral dysfunction.   No epileptiform pattern or seizure seen.    Lauro Oliver MD PGY-5  Epilepsy Fellow    Reading Room: [phone]  On Call Service After Hours: [phone]    No seizures x 2 days    In absence of additional clinical concerns, recommend consideration for discontinuation of current EEG study with reconnection in future if warranted.    [General recommendations for CEEG/LTM duration:  1 Day recording if patient awake and no epileptiform abnormalities recorded.  2 Day recording if patient comatose and no epileptiform abnormalities recorded.  2-3 Day recording if patient comatose and epileptiform abnormalities recorded, with no seizures recorded.  Discontinuation of recording if patient with seizures initially on recording, and no subsequent seizures recorded x 1-2 Days.]     [facility]   COMPREHENSIVE EPILEPSY CENTER   REPORT OF LONG-TERM VIDEO EEG     [facility]: [address], [address], [place] [address], Ph#: [phone]  [place]:  [address] [place] [address], Ph#: [phone]  [facility]: [address] [place] [address], Ph#: [phone]    Patient Name: JIMENA LOPEZ  Age and : 71y (54)  MRN #: [id number]  Location: [address]   Referring Physician: Kelsey Hernandez    Start Time/Date: 08:00 on 2022  End Time/Date: 08:00 on 2022  Duration: 24:00h  _____________________________________________________________  STUDY INFORMATION    EEG Recording Technique:  The patient underwent continuous Video-EEG monitoring, using Telemetry System hardware on the XLTek Digital System. EEG and video data were stored on a computer hard drive with important events saved in digital archive files. The material was reviewed by a physician (electroencephalographer / epileptologist) on a daily basis. Mian and seizure detection algorithms were utilized and reviewed. An EEG Technician attended to the patient, and was available throughout daytime work hours.  The epilepsy center neurologist was available in person or on call 24-hours per day.    EEG Placement and Labeling of Electrodes:  The EEG was performed utilizing 20 channel referential EEG connections (coronal over temporal over parasagittal montage) using all standard 10-20 electrode placements with EKG, with additional electrodes placed in the inferior temporal region using the modified 10-10 montage electrode placements for elective admissions, or if deemed necessary. Recording was at a sampling rate of 256 samples per second per channel. Time synchronized digital video recording was done simultaneously with EEG recording. A low light infrared camera was used for low light recording.     _____________________________________________________________  HISTORY : Patient is a 71y old  Female who presents with a chief complaint of Angioedema (2022 00:45)    PERTINENT MEDICATION: none  _____________________________________________________________  STUDY INTERPRETATION    Findings: The background was continuous, spontaneously variable and reactive. During wakefulness, No posterior dominant rhythm seen.    Background Slowing:  Diffuse theta and polymorphic delta slowing.    Focal Slowing:   None were present.    Sleep Background:  Stage II sleep transients were not recorded.    Other Non-Epileptiform Findings:  None were present.    Interictal Epileptiform Activity:   None were present.    Events:  Clinical events: None recorded.  Seizures: None recorded.    Activation Procedures:   Hyperventilation was not performed.    Photic stimulation was not performed.     Artifacts:  Intermittent myogenic and movement artifacts were noted.    ECG:  The heart rate on single channel ECG was predominantly between 50-60 BPM.    _____________________________________________________________  EEG SUMMARY/CLASSIFICATION    Abnormal EEG in an encephalopathic patient.  - Moderate generalized slowing.    _____________________________________________________________  EEG IMPRESSION/CLINICAL CORRELATE    Abnormal EEG study.  Moderate nonspecific diffuse or multifocal cerebral dysfunction.   No epileptiform pattern or seizure seen.  No seizures x 2 days    In absence of additional clinical concerns, recommend consideration for discontinuation of current EEG study with reconnection in future if warranted.    [General recommendations for CEEG/LTM duration:  1 Day recording if patient awake and no epileptiform abnormalities recorded.  2 Day recording if patient comatose and no epileptiform abnormalities recorded.  2-3 Day recording if patient comatose and epileptiform abnormalities recorded, with no seizures recorded.  Discontinuation of recording if patient with seizures initially on recording, and no subsequent seizures recorded x 1-2 Days.]      Lauro Oliver MD PGY-5  Epilepsy Fellow    Reading Room: [phone]  On Call Service After Hours: [phone]

## 2022-02-07 LAB
ANION GAP SERPL CALC-SCNC: 10 MMOL/L — SIGNIFICANT CHANGE UP (ref 5–17)
BUN SERPL-MCNC: 24.9 MG/DL — HIGH (ref 8–20)
CALCIUM SERPL-MCNC: 7.5 MG/DL — LOW (ref 8.6–10.2)
CHLORIDE SERPL-SCNC: 108 MMOL/L — HIGH (ref 98–107)
CO2 SERPL-SCNC: 23 MMOL/L — SIGNIFICANT CHANGE UP (ref 22–29)
CREAT SERPL-MCNC: 0.64 MG/DL — SIGNIFICANT CHANGE UP (ref 0.5–1.3)
GAS PNL BLDA: SIGNIFICANT CHANGE UP
GLUCOSE BLDC GLUCOMTR-MCNC: 119 MG/DL — HIGH (ref 70–99)
GLUCOSE BLDC GLUCOMTR-MCNC: 143 MG/DL — HIGH (ref 70–99)
GLUCOSE BLDC GLUCOMTR-MCNC: 149 MG/DL — HIGH (ref 70–99)
GLUCOSE BLDC GLUCOMTR-MCNC: 155 MG/DL — HIGH (ref 70–99)
GLUCOSE BLDC GLUCOMTR-MCNC: 194 MG/DL — HIGH (ref 70–99)
GLUCOSE SERPL-MCNC: 168 MG/DL — HIGH (ref 70–99)
HCT VFR BLD CALC: 35.1 % — SIGNIFICANT CHANGE UP (ref 34.5–45)
HGB BLD-MCNC: 11.5 G/DL — SIGNIFICANT CHANGE UP (ref 11.5–15.5)
MAGNESIUM SERPL-MCNC: 2.5 MG/DL — SIGNIFICANT CHANGE UP (ref 1.6–2.6)
MCHC RBC-ENTMCNC: 31.6 PG — SIGNIFICANT CHANGE UP (ref 27–34)
MCHC RBC-ENTMCNC: 32.8 GM/DL — SIGNIFICANT CHANGE UP (ref 32–36)
MCV RBC AUTO: 96.4 FL — SIGNIFICANT CHANGE UP (ref 80–100)
PHOSPHATE SERPL-MCNC: 3.8 MG/DL — SIGNIFICANT CHANGE UP (ref 2.4–4.7)
PLATELET # BLD AUTO: 119 K/UL — LOW (ref 150–400)
POTASSIUM SERPL-MCNC: 4.2 MMOL/L — SIGNIFICANT CHANGE UP (ref 3.5–5.3)
POTASSIUM SERPL-SCNC: 4.2 MMOL/L — SIGNIFICANT CHANGE UP (ref 3.5–5.3)
RBC # BLD: 3.64 M/UL — LOW (ref 3.8–5.2)
RBC # FLD: 13 % — SIGNIFICANT CHANGE UP (ref 10.3–14.5)
SODIUM SERPL-SCNC: 141 MMOL/L — SIGNIFICANT CHANGE UP (ref 135–145)
WBC # BLD: 6.9 K/UL — SIGNIFICANT CHANGE UP (ref 3.8–10.5)
WBC # FLD AUTO: 6.9 K/UL — SIGNIFICANT CHANGE UP (ref 3.8–10.5)

## 2022-02-07 PROCEDURE — 71045 X-RAY EXAM CHEST 1 VIEW: CPT | Mod: 26

## 2022-02-07 PROCEDURE — 70450 CT HEAD/BRAIN W/O DYE: CPT | Mod: 26

## 2022-02-07 PROCEDURE — 99233 SBSQ HOSP IP/OBS HIGH 50: CPT

## 2022-02-07 RX ORDER — PANTOPRAZOLE SODIUM 20 MG/1
40 TABLET, DELAYED RELEASE ORAL DAILY
Refills: 0 | Status: DISCONTINUED | OUTPATIENT
Start: 2022-02-07 | End: 2022-02-11

## 2022-02-07 RX ORDER — DEXAMETHASONE 0.5 MG/5ML
2 ELIXIR ORAL DAILY
Refills: 0 | Status: DISCONTINUED | OUTPATIENT
Start: 2022-02-07 | End: 2022-02-09

## 2022-02-07 RX ORDER — LABETALOL HCL 100 MG
10 TABLET ORAL EVERY 4 HOURS
Refills: 0 | Status: DISCONTINUED | OUTPATIENT
Start: 2022-02-07 | End: 2022-02-09

## 2022-02-07 RX ORDER — PROPOFOL 10 MG/ML
20 INJECTION, EMULSION INTRAVENOUS
Qty: 1000 | Refills: 0 | Status: DISCONTINUED | OUTPATIENT
Start: 2022-02-07 | End: 2022-02-08

## 2022-02-07 RX ADMIN — PROPOFOL 11.7 MICROGRAM(S)/KG/MIN: 10 INJECTION, EMULSION INTRAVENOUS at 21:27

## 2022-02-07 RX ADMIN — Medication 6 MILLIGRAM(S): at 00:06

## 2022-02-07 RX ADMIN — Medication 6 UNIT(S): at 23:32

## 2022-02-07 RX ADMIN — Medication 2: at 11:07

## 2022-02-07 RX ADMIN — Medication 2 MILLIGRAM(S): at 17:15

## 2022-02-07 RX ADMIN — Medication 50 MILLIGRAM(S): at 03:10

## 2022-02-07 RX ADMIN — MEROPENEM 200 MILLIGRAM(S): 1 INJECTION INTRAVENOUS at 21:27

## 2022-02-07 RX ADMIN — Medication 6 UNIT(S): at 17:16

## 2022-02-07 RX ADMIN — INSULIN GLARGINE 25 UNIT(S): 100 INJECTION, SOLUTION SUBCUTANEOUS at 23:30

## 2022-02-07 RX ADMIN — MEROPENEM 200 MILLIGRAM(S): 1 INJECTION INTRAVENOUS at 06:11

## 2022-02-07 RX ADMIN — CHLORHEXIDINE GLUCONATE 15 MILLILITER(S): 213 SOLUTION TOPICAL at 06:07

## 2022-02-07 RX ADMIN — CHLORHEXIDINE GLUCONATE 1 APPLICATION(S): 213 SOLUTION TOPICAL at 06:10

## 2022-02-07 RX ADMIN — Medication 6 UNIT(S): at 00:06

## 2022-02-07 RX ADMIN — Medication 6 MILLIGRAM(S): at 06:08

## 2022-02-07 RX ADMIN — Medication 6 UNIT(S): at 06:10

## 2022-02-07 RX ADMIN — PANTOPRAZOLE SODIUM 40 MILLIGRAM(S): 20 TABLET, DELAYED RELEASE ORAL at 17:15

## 2022-02-07 RX ADMIN — Medication 6 UNIT(S): at 11:07

## 2022-02-07 RX ADMIN — INSULIN GLARGINE 25 UNIT(S): 100 INJECTION, SOLUTION SUBCUTANEOUS at 00:03

## 2022-02-07 RX ADMIN — ENOXAPARIN SODIUM 40 MILLIGRAM(S): 100 INJECTION SUBCUTANEOUS at 11:06

## 2022-02-07 RX ADMIN — PROPOFOL 11.7 MICROGRAM(S)/KG/MIN: 10 INJECTION, EMULSION INTRAVENOUS at 15:01

## 2022-02-07 RX ADMIN — FAMOTIDINE 20 MILLIGRAM(S): 10 INJECTION INTRAVENOUS at 06:12

## 2022-02-07 RX ADMIN — Medication 6 MILLIGRAM(S): at 11:06

## 2022-02-07 RX ADMIN — CHLORHEXIDINE GLUCONATE 15 MILLILITER(S): 213 SOLUTION TOPICAL at 17:40

## 2022-02-07 RX ADMIN — MEROPENEM 200 MILLIGRAM(S): 1 INJECTION INTRAVENOUS at 15:01

## 2022-02-07 RX ADMIN — Medication 2: at 17:17

## 2022-02-07 NOTE — PROCEDURE NOTE - ADDITIONAL PROCEDURE DETAILS
Called to bedside by RN, Pt apneic post extubation, unarousable with deep sternal rub. Emergently intubated for airway protection

## 2022-02-07 NOTE — CHART NOTE - NSCHARTNOTEFT_GEN_A_CORE
Spoke with pt daughter Jamilah regarding her mother needing to be emergently reintubated today after extubation. It was explained to daughter that her mother is currently to weak and will need more time before we extubate again. All questions and concerns addressed at this time.

## 2022-02-07 NOTE — CHART NOTE - NSCHARTNOTEFT_GEN_A_CORE
Patient had + cuff leak, doing well on SBT.  Decision made to extubate the patient.  After extubation patient was comfortable, without stridor or labored breathing. About 1 hour later I was called back because patient was more lethargic, with short periods of apnea, now responsive only to painful stimuli.    Decision made to re-intubate.  Patient intubated successfully using glidescope with S4 blade, 7.0mm ET tube, secured at 23cm at the lip.  Mild edema in aryepiglottic folds.    Patient was extremely weak post extubation.  Suspect this was in part due to sedation but likely also due to steroid induced myopathy.

## 2022-02-07 NOTE — PROGRESS NOTE ADULT - SUBJECTIVE AND OBJECTIVE BOX
Patient is a 71y old  Female who presents with a chief complaint of Angioedema (06 Feb 2022 13:09)      BRIEF HOSPITAL COURSE:   71F w/ PMHx HTN on ACE-I, asthma, B/L mastectomy presented on 02/03 w/ acute onset swelling of her tongue and base of mouth. Emergently taken to the OR from the ED and underwent nasotracheal intubation by anesthesiology w/ ACS standby on 2/3/22.    PAST MEDICAL & SURGICAL HISTORY:  Asthma    Diabetes          Medications:  meropenem  IVPB 2000 milliGRAM(s) IV Intermittent every 8 hours  meropenem  IVPB        norepinephrine Infusion 0.05 MICROgram(s)/kG/Min IV Continuous <Continuous>    diphenhydrAMINE Injectable 50 milliGRAM(s) IV Push every 6 hours    fentaNYL   Infusion. 0.5 MICROgram(s)/kG/Hr IV Continuous <Continuous>  midazolam Infusion 0.02 mG/kG/Hr IV Continuous <Continuous>  midazolam Injectable 2 milliGRAM(s) IV Push every 2 hours PRN      enoxaparin Injectable 40 milliGRAM(s) SubCutaneous daily    famotidine Injectable 20 milliGRAM(s) IV Push every 12 hours      dexAMETHasone  Injectable 6 milliGRAM(s) IV Push every 6 hours  dextrose 40% Gel 15 Gram(s) Oral once  dextrose 50% Injectable 25 Gram(s) IV Push once  dextrose 50% Injectable 12.5 Gram(s) IV Push once  dextrose 50% Injectable 25 Gram(s) IV Push once  glucagon  Injectable 1 milliGRAM(s) IntraMuscular once  insulin glargine Injectable (LANTUS) 25 Unit(s) SubCutaneous at bedtime  insulin lispro (ADMELOG) corrective regimen sliding scale   SubCutaneous every 6 hours  insulin lispro Injectable (ADMELOG) 6 Unit(s) SubCutaneous every 6 hours    dextrose 5%. 1000 milliLiter(s) IV Continuous <Continuous>  dextrose 5%. 1000 milliLiter(s) IV Continuous <Continuous>  lactated ringers. 1000 milliLiter(s) IV Continuous <Continuous>      chlorhexidine 0.12% Liquid 15 milliLiter(s) Oral Mucosa every 12 hours  chlorhexidine 2% Cloths 1 Application(s) Topical <User Schedule>        Mode: AC/ CMV (Assist Control/ Continuous Mandatory Ventilation)  RR (machine): 16  TV (machine): 450  FiO2: 40  PEEP: 5  ITime: 0.9  MAP: 9  PIP: 24      ICU Vital Signs Last 24 Hrs  T(C): 36.8 (06 Feb 2022 23:00), Max: 37 (06 Feb 2022 02:00)  T(F): 98.2 (06 Feb 2022 23:00), Max: 98.6 (06 Feb 2022 02:00)  HR: 55 (07 Feb 2022 00:06) (49 - 65)  BP: 111/70 (06 Feb 2022 23:00) (89/47 - 130/70)  BP(mean): 84 (06 Feb 2022 23:00) (60 - 89)  ABP: --  ABP(mean): --  RR: 17 (06 Feb 2022 23:00) (16 - 17)  SpO2: 99% (07 Feb 2022 00:06) (96% - 100%)      ABG - ( 06 Feb 2022 04:23 )  pH, Arterial: 7.500 pH, Blood: x     /  pCO2: 37    /  pO2: 101   / HCO3: 29    / Base Excess: 5.7   /  SaO2: 99.1                I&O's Detail    05 Feb 2022 07:01  -  06 Feb 2022 07:00  --------------------------------------------------------  IN:    FentaNYL: 700.8 mL    IV PiggyBack: 300 mL    Lactated Ringers: 1800 mL    Midazolam: 122.4 mL    Norepinephrine: 66.8 mL  Total IN: 2990 mL    OUT:    Indwelling Catheter - Urethral (mL): 1650 mL  Total OUT: 1650 mL    Total NET: 1340 mL      06 Feb 2022 07:01  -  07 Feb 2022 01:42  --------------------------------------------------------  IN:    FentaNYL: 462 mL    Lactated Ringers: 1275 mL    Midazolam: 78.4 mL    Norepinephrine: 15.3 mL  Total IN: 1830.7 mL    OUT:    Indwelling Catheter - Urethral (mL): 925 mL  Total OUT: 925 mL    Total NET: 905.7 mL            LABS:                        11.2   11.06 )-----------( 151      ( 06 Feb 2022 04:17 )             34.3     02-06    145  |  109<H>  |  21.9<H>  ----------------------------<  157<H>  3.9   |  24.0  |  0.59    Ca    7.6<L>      06 Feb 2022 04:17  Phos  3.4     02-06  Mg     2.4     02-06            CAPILLARY BLOOD GLUCOSE      POCT Blood Glucose.: 119 mg/dL (07 Feb 2022 00:01)        CULTURES:  Culture Results:   No growth at 48 hours (02-03 @ 06:43)  Culture Results:   No growth at 48 hours (02-03 @ 06:42)      Physical Examination:  GENERAL: In NAD, intubated  HEENT: ETT in nare  NECK: Supple, trachea midline; no stridor  PULM: Coarse mechanical breath sounds B/L  CVS: +S1, S2  ABD: Soft, non-tender  EXT: No pedal edema  SKIN: Warm and well perfused, no rashes noted.  NEURO: Sedated    DEVICES:     RADIOLOGY:   < from: CT Chest No Cont (02.04.22 @ 16:36) >    ACC: [id number] EXAM:  CT CHEST                          PROCEDURE DATE:  02/04/2022          INTERPRETATION:  CLINICAL INFORMATION: Neck swelling, intubation    TECHNIQUE:  A volumetric CT acquisition of the chest was obtained from   the thoracic inlet to the upper abdomen, without the administration  of   intravenous contrast. Coronal and sagittal multiplanar reformations were   also submitted.    Comparison: 2/3/2022    FINDINGS:    Lungs/Airways/Pleura: There is atelectasis in the lower lobes.   Endotracheal tube terminates in the upper trachea. Trace pleural   effusions.    Mediastinum/Lymph nodes: No thoracic adenopathy.    Heart and Vessels: The heart is stable in size. No pericardial effusion.   There are coronary artery calcifications. The aorta is normal in caliber.    Upper Abdomen: Vicarious excretion of contrast in the gallbladder.    Osseous structures and Soft Tissues: No mass or hematoma in the imaged   lower neck.    IMPRESSION:    No mass or hematoma in the imaged lower neck.    --- End of Report ---            YAJAIRA TAYLOR M.D., Attending Radiologist  This document has been electronically signed. Feb 4 2022  4:50PM    < end of copied text >        < from: CT Chest w/ IV Cont (02.03.22 @ 16:34) >    ACC: [id number] EXAM:  CT CHEST IC                          PROCEDURE DATE:  02/03/2022          INTERPRETATION:  Clinical information: Shortness of breath.    CT scan of the chest was obtained following administration of intravenous   contrast. Approximately 95 cc of Omnipaque 350 was administered and 5 cc   was discarded.    No hilar and or mediastinal adenopathy is noted.    Heart is enlarged in size. Calcification of the aortic valve and the   coronary arteries is noted. No pericardial effusion is noted.    Endotracheal tube is noted in place. No endobronchial lesions are noted.   Linear opacities representing atelectasis are noted within the right   middle and both lower lobes. Trace right pleural effusion is noted.    Below the diaphragm, visualized portions of the abdomen are unremarkable.    Degenerative changes of the spine are noted.    IMPRESSION: Linear opacities representing atelectasis are noted in the   right middle and both lower lobes.    --- End of Report ---            CAITLIN SWIFT MD; Attending Radiologist  This document has been electronically signed. Feb  3 2022  4:40PM    < end of copied text >        < from: CT Head No Cont (02.04.22 @ 16:36) >    ACC: [id number] EXAM:  CT BRAIN                          PROCEDURE DATE:  02/04/2022          INTERPRETATION:  Exam Date: 2/4/2022 4:36 PM    CT head without IV contrast    CLINICAL INFORMATION:  Upward gaze Admitting Dxs: T78.3XXA SOB    TECHNIQUE: Contiguous axial sections were obtained through the head.     Coronal and sagittal reformats were obtained.    COMPARISON: No previous examinations are available for review.    FINDINGS:    There is no evidence of intraparenchymal or extraaxial hemorrhage.     There is no CT evidence of large vessel acute infarct. No mass effect is   found in the brain.  No evidence of midline shift or herniation pattern.    The ventricles, sulci and basal cisterns appear unremarkable.    Visualized paranasal sinuses are clear.    ET tube is present.    IMPRESSION:    No acute intracranial findings.    --- End of Report ---            JACIEL PURDY MD; Attending Radiologist  This document has been electronically signed. Feb 4 2022  4:42PM    < end of copied text >        Antibiotics Course:    meropenem  IVPB   200 mL/Hr (02-06-22 @ 22:48)   200 mL/Hr (02-06-22 @ 17:14)   200 mL/Hr (02-06-22 @ 06:19)   200 mL/Hr (02-05-22 @ 23:35)   200 mL/Hr (02-05-22 @ 13:03)   200 mL/Hr (02-05-22 @ 06:45)   200 mL/Hr (02-04-22 @ 23:53)   200 mL/Hr (02-04-22 @ 15:28)   200 mL/Hr (02-04-22 @ 05:27)   200 mL/Hr (02-03-22 @ 23:05)   200 mL/Hr (02-03-22 @ 15:11)    meropenem  IVPB   200 mL/Hr (02-03-22 @ 07:40)

## 2022-02-08 LAB
ANION GAP SERPL CALC-SCNC: 12 MMOL/L — SIGNIFICANT CHANGE UP (ref 5–17)
APTT BLD: 27.2 SEC — LOW (ref 27.5–35.5)
BUN SERPL-MCNC: 32.7 MG/DL — HIGH (ref 8–20)
CA-I BLD-SCNC: 1.02 MMOL/L — LOW (ref 1.15–1.33)
CALCIUM SERPL-MCNC: 7.7 MG/DL — LOW (ref 8.6–10.2)
CHLORIDE SERPL-SCNC: 107 MMOL/L — SIGNIFICANT CHANGE UP (ref 98–107)
CO2 SERPL-SCNC: 22 MMOL/L — SIGNIFICANT CHANGE UP (ref 22–29)
CREAT SERPL-MCNC: 0.69 MG/DL — SIGNIFICANT CHANGE UP (ref 0.5–1.3)
CULTURE RESULTS: SIGNIFICANT CHANGE UP
CULTURE RESULTS: SIGNIFICANT CHANGE UP
GLUCOSE BLDC GLUCOMTR-MCNC: 120 MG/DL — HIGH (ref 70–99)
GLUCOSE BLDC GLUCOMTR-MCNC: 123 MG/DL — HIGH (ref 70–99)
GLUCOSE BLDC GLUCOMTR-MCNC: 168 MG/DL — HIGH (ref 70–99)
GLUCOSE SERPL-MCNC: 150 MG/DL — HIGH (ref 70–99)
HCT VFR BLD CALC: 38.7 % — SIGNIFICANT CHANGE UP (ref 34.5–45)
HGB BLD-MCNC: 12.7 G/DL — SIGNIFICANT CHANGE UP (ref 11.5–15.5)
INR BLD: 1.25 RATIO — HIGH (ref 0.88–1.16)
MAGNESIUM SERPL-MCNC: 2.7 MG/DL — HIGH (ref 1.6–2.6)
MCHC RBC-ENTMCNC: 31.7 PG — SIGNIFICANT CHANGE UP (ref 27–34)
MCHC RBC-ENTMCNC: 32.8 GM/DL — SIGNIFICANT CHANGE UP (ref 32–36)
MCV RBC AUTO: 96.5 FL — SIGNIFICANT CHANGE UP (ref 80–100)
PHOSPHATE SERPL-MCNC: 3.7 MG/DL — SIGNIFICANT CHANGE UP (ref 2.4–4.7)
PLATELET # BLD AUTO: 190 K/UL — SIGNIFICANT CHANGE UP (ref 150–400)
POTASSIUM SERPL-MCNC: 4.4 MMOL/L — SIGNIFICANT CHANGE UP (ref 3.5–5.3)
POTASSIUM SERPL-SCNC: 4.4 MMOL/L — SIGNIFICANT CHANGE UP (ref 3.5–5.3)
PROTHROM AB SERPL-ACNC: 14.3 SEC — HIGH (ref 10.6–13.6)
RBC # BLD: 4.01 M/UL — SIGNIFICANT CHANGE UP (ref 3.8–5.2)
RBC # FLD: 12.9 % — SIGNIFICANT CHANGE UP (ref 10.3–14.5)
SODIUM SERPL-SCNC: 141 MMOL/L — SIGNIFICANT CHANGE UP (ref 135–145)
SPECIMEN SOURCE: SIGNIFICANT CHANGE UP
SPECIMEN SOURCE: SIGNIFICANT CHANGE UP
WBC # BLD: 16.15 K/UL — HIGH (ref 3.8–10.5)
WBC # FLD AUTO: 16.15 K/UL — HIGH (ref 3.8–10.5)

## 2022-02-08 PROCEDURE — 99233 SBSQ HOSP IP/OBS HIGH 50: CPT

## 2022-02-08 RX ORDER — LABETALOL HCL 100 MG
200 TABLET ORAL EVERY 8 HOURS
Refills: 0 | Status: DISCONTINUED | OUTPATIENT
Start: 2022-02-08 | End: 2022-02-09

## 2022-02-08 RX ORDER — DEXMEDETOMIDINE HYDROCHLORIDE IN 0.9% SODIUM CHLORIDE 4 UG/ML
0.2 INJECTION INTRAVENOUS
Qty: 200 | Refills: 0 | Status: DISCONTINUED | OUTPATIENT
Start: 2022-02-08 | End: 2022-02-09

## 2022-02-08 RX ADMIN — Medication 2: at 18:37

## 2022-02-08 RX ADMIN — DEXMEDETOMIDINE HYDROCHLORIDE IN 0.9% SODIUM CHLORIDE 4.87 MICROGRAM(S)/KG/HR: 4 INJECTION INTRAVENOUS at 21:47

## 2022-02-08 RX ADMIN — DEXMEDETOMIDINE HYDROCHLORIDE IN 0.9% SODIUM CHLORIDE 4.87 MICROGRAM(S)/KG/HR: 4 INJECTION INTRAVENOUS at 12:14

## 2022-02-08 RX ADMIN — Medication 6 UNIT(S): at 05:16

## 2022-02-08 RX ADMIN — DEXMEDETOMIDINE HYDROCHLORIDE IN 0.9% SODIUM CHLORIDE 4.87 MICROGRAM(S)/KG/HR: 4 INJECTION INTRAVENOUS at 20:28

## 2022-02-08 RX ADMIN — CHLORHEXIDINE GLUCONATE 15 MILLILITER(S): 213 SOLUTION TOPICAL at 18:38

## 2022-02-08 RX ADMIN — PROPOFOL 11.7 MICROGRAM(S)/KG/MIN: 10 INJECTION, EMULSION INTRAVENOUS at 05:16

## 2022-02-08 RX ADMIN — Medication 200 MILLIGRAM(S): at 21:28

## 2022-02-08 RX ADMIN — CHLORHEXIDINE GLUCONATE 15 MILLILITER(S): 213 SOLUTION TOPICAL at 05:16

## 2022-02-08 RX ADMIN — Medication 6 UNIT(S): at 18:39

## 2022-02-08 RX ADMIN — ENOXAPARIN SODIUM 40 MILLIGRAM(S): 100 INJECTION SUBCUTANEOUS at 12:14

## 2022-02-08 RX ADMIN — PROPOFOL 11.7 MICROGRAM(S)/KG/MIN: 10 INJECTION, EMULSION INTRAVENOUS at 11:30

## 2022-02-08 RX ADMIN — Medication 10 MILLIGRAM(S): at 05:15

## 2022-02-08 RX ADMIN — DEXMEDETOMIDINE HYDROCHLORIDE IN 0.9% SODIUM CHLORIDE 4.87 MICROGRAM(S)/KG/HR: 4 INJECTION INTRAVENOUS at 13:09

## 2022-02-08 RX ADMIN — DEXMEDETOMIDINE HYDROCHLORIDE IN 0.9% SODIUM CHLORIDE 4.87 MICROGRAM(S)/KG/HR: 4 INJECTION INTRAVENOUS at 18:41

## 2022-02-08 RX ADMIN — PANTOPRAZOLE SODIUM 40 MILLIGRAM(S): 20 TABLET, DELAYED RELEASE ORAL at 12:15

## 2022-02-08 RX ADMIN — Medication 2 MILLIGRAM(S): at 05:15

## 2022-02-08 RX ADMIN — CHLORHEXIDINE GLUCONATE 1 APPLICATION(S): 213 SOLUTION TOPICAL at 05:16

## 2022-02-08 RX ADMIN — DEXMEDETOMIDINE HYDROCHLORIDE IN 0.9% SODIUM CHLORIDE 4.87 MICROGRAM(S)/KG/HR: 4 INJECTION INTRAVENOUS at 16:45

## 2022-02-08 NOTE — PROGRESS NOTE ADULT - SUBJECTIVE AND OBJECTIVE BOX
Patient is a 71y old  Female who presents with a chief complaint of Angioedema (07 Feb 2022 01:42)      BRIEF HOSPITAL COURSE:   71F w/ PMHx HTN on ACE-I, asthma, B/L mastectomy presented on 02/03 w/ acute onset swelling of her tongue and base of mouth. Emergently taken to the OR from the ED and underwent nasotracheal intubation by anesthesiology w/ ACS standby on 2/3/22. Was extubated on 2/7/22 but became obtunded thereafter and was subsequently re-intubated orally.        PAST MEDICAL & SURGICAL HISTORY:  Asthma    Diabetes          Medications:    labetalol Injectable 10 milliGRAM(s) IV Push every 4 hours PRN      propofol Infusion 20 MICROgram(s)/kG/Min IV Continuous <Continuous>      enoxaparin Injectable 40 milliGRAM(s) SubCutaneous daily    pantoprazole  Injectable 40 milliGRAM(s) IV Push daily      dexAMETHasone  Injectable 2 milliGRAM(s) IV Push daily  dextrose 40% Gel 15 Gram(s) Oral once  dextrose 50% Injectable 25 Gram(s) IV Push once  dextrose 50% Injectable 12.5 Gram(s) IV Push once  dextrose 50% Injectable 25 Gram(s) IV Push once  insulin glargine Injectable (LANTUS) 25 Unit(s) SubCutaneous at bedtime  insulin lispro (ADMELOG) corrective regimen sliding scale   SubCutaneous every 6 hours  insulin lispro Injectable (ADMELOG) 6 Unit(s) SubCutaneous every 6 hours    dextrose 5%. 1000 milliLiter(s) IV Continuous <Continuous>  dextrose 5%. 1000 milliLiter(s) IV Continuous <Continuous>      chlorhexidine 0.12% Liquid 15 milliLiter(s) Oral Mucosa every 12 hours  chlorhexidine 2% Cloths 1 Application(s) Topical <User Schedule>        Mode: AC/ CMV (Assist Control/ Continuous Mandatory Ventilation)  RR (machine): 16  TV (machine): 450  FiO2: 40  PEEP: 5  ITime: 0.9  MAP: 10  PIP: 29      ICU Vital Signs Last 24 Hrs  T(C): 37.7 (08 Feb 2022 01:00), Max: 37.7 (07 Feb 2022 12:30)  T(F): 99.9 (08 Feb 2022 01:00), Max: 99.9 (07 Feb 2022 12:30)  HR: 88 (08 Feb 2022 01:00) (62 - 129)  BP: 114/67 (08 Feb 2022 01:00) (114/67 - 200/90)  BP(mean): 81 (08 Feb 2022 01:00) (75 - 122)  ABP: --  ABP(mean): --  RR: 17 (08 Feb 2022 01:00) (9 - 22)  SpO2: 100% (08 Feb 2022 01:00) (88% - 100%)      ABG - ( 07 Feb 2022 04:43 )  pH, Arterial: 7.400 pH, Blood: x     /  pCO2: 44    /  pO2: 91    / HCO3: 27    / Base Excess: 2.5   /  SaO2: 98.3                I&O's Detail    06 Feb 2022 07:01  -  07 Feb 2022 07:00  --------------------------------------------------------  IN:    FentaNYL: 496 mL    Lactated Ringers: 1650 mL    Midazolam: 90 mL    Norepinephrine: 15.3 mL  Total IN: 2251.3 mL    OUT:    Indwelling Catheter - Urethral (mL): 1175 mL  Total OUT: 1175 mL    Total NET: 1076.3 mL      07 Feb 2022 07:01  -  08 Feb 2022 03:50  --------------------------------------------------------  IN:    FentaNYL: 14.7 mL    IV PiggyBack: 100 mL    Lactated Ringers: 375 mL    Midazolam: 5.7 mL    Propofol: 99.5 mL  Total IN: 594.9 mL    OUT:    Indwelling Catheter - Urethral (mL): 1060 mL    Norepinephrine: 0 mL  Total OUT: 1060 mL    Total NET: -465.1 mL            LABS:                        11.5   6.90  )-----------( 119      ( 07 Feb 2022 03:51 )             35.1     02-07    141  |  108<H>  |  24.9<H>  ----------------------------<  168<H>  4.2   |  23.0  |  0.64    Ca    7.5<L>      07 Feb 2022 03:51  Phos  3.8     02-07  Mg     2.5     02-07            CAPILLARY BLOOD GLUCOSE      POCT Blood Glucose.: 143 mg/dL (07 Feb 2022 23:28)        CULTURES:  Culture Results:   No growth at 48 hours (02-03 @ 06:43)  Culture Results:   No growth at 48 hours (02-03 @ 06:42)      Physical Examination:  GENERAL: In NAD, intubated  HEENT: Atraumatic  NECK: Supple, trachea midline; no stridor  PULM: Coarse mechanical breath sounds B/L  CVS: +S1, S2  ABD: Soft, non-tender  EXT: No pedal edema  SKIN: Warm and well perfused, no rashes noted.  NEURO: Sedated    DEVICES:     RADIOLOGY:   < from: Xray Chest 1 View-PORTABLE IMMEDIATE (Xray Chest 1 View-PORTABLE IMMEDIATE .) (02.07.22 @ 13:31) >    ACC: [id number] EXAM:  XR CHEST PORTABLE IMMED 1V                          PROCEDURE DATE:  02/07/2022          INTERPRETATION:  INDICATION: NGT placement    PRIORS: 2/3/2022    VIEWS: Portable AP radiography of the chest performed.    FINDINGS: Heart size appears within normal limits. There is no change in   position of the indwelling ETT. An NGT has been introduced, the distal   aspect overlying the expected region of the stomach. No superior   mediastinal abnormalities. Hazy opacity overlying the lower lung field   suggests the presence of infiltrate, atelectasis and/or pleural effusion.   No evidence of pneumothorax. No mediastinal shift. No acute osseous   fractures.    IMPRESSION: As above.    --- End of Report ---            MYLENE BRANHAM MD; Attending Radiologist  This document has been electronically signed. Feb 7 2022  2:53PM    < end of copied text >

## 2022-02-09 LAB
ANION GAP SERPL CALC-SCNC: 10 MMOL/L — SIGNIFICANT CHANGE UP (ref 5–17)
APTT BLD: 27.8 SEC — SIGNIFICANT CHANGE UP (ref 27.5–35.5)
BUN SERPL-MCNC: 29 MG/DL — HIGH (ref 8–20)
CA-I BLD-SCNC: 1.03 MMOL/L — LOW (ref 1.15–1.33)
CALCIUM SERPL-MCNC: 7.6 MG/DL — LOW (ref 8.6–10.2)
CHLORIDE SERPL-SCNC: 106 MMOL/L — SIGNIFICANT CHANGE UP (ref 98–107)
CO2 SERPL-SCNC: 23 MMOL/L — SIGNIFICANT CHANGE UP (ref 22–29)
CREAT SERPL-MCNC: 0.52 MG/DL — SIGNIFICANT CHANGE UP (ref 0.5–1.3)
GLUCOSE BLDC GLUCOMTR-MCNC: 124 MG/DL — HIGH (ref 70–99)
GLUCOSE BLDC GLUCOMTR-MCNC: 194 MG/DL — HIGH (ref 70–99)
GLUCOSE BLDC GLUCOMTR-MCNC: 225 MG/DL — HIGH (ref 70–99)
GLUCOSE BLDC GLUCOMTR-MCNC: 261 MG/DL — HIGH (ref 70–99)
GLUCOSE SERPL-MCNC: 225 MG/DL — HIGH (ref 70–99)
HCT VFR BLD CALC: 35.2 % — SIGNIFICANT CHANGE UP (ref 34.5–45)
HGB BLD-MCNC: 12.1 G/DL — SIGNIFICANT CHANGE UP (ref 11.5–15.5)
INR BLD: 1.25 RATIO — HIGH (ref 0.88–1.16)
MAGNESIUM SERPL-MCNC: 2.3 MG/DL — SIGNIFICANT CHANGE UP (ref 1.6–2.6)
MCHC RBC-ENTMCNC: 32.4 PG — SIGNIFICANT CHANGE UP (ref 27–34)
MCHC RBC-ENTMCNC: 34.4 GM/DL — SIGNIFICANT CHANGE UP (ref 32–36)
MCV RBC AUTO: 94.1 FL — SIGNIFICANT CHANGE UP (ref 80–100)
PHOSPHATE SERPL-MCNC: 2.7 MG/DL — SIGNIFICANT CHANGE UP (ref 2.4–4.7)
PLATELET # BLD AUTO: 119 K/UL — LOW (ref 150–400)
POTASSIUM SERPL-MCNC: 3.9 MMOL/L — SIGNIFICANT CHANGE UP (ref 3.5–5.3)
POTASSIUM SERPL-SCNC: 3.9 MMOL/L — SIGNIFICANT CHANGE UP (ref 3.5–5.3)
PROTHROM AB SERPL-ACNC: 14.4 SEC — HIGH (ref 10.6–13.6)
RBC # BLD: 3.74 M/UL — LOW (ref 3.8–5.2)
RBC # FLD: 12.7 % — SIGNIFICANT CHANGE UP (ref 10.3–14.5)
SODIUM SERPL-SCNC: 139 MMOL/L — SIGNIFICANT CHANGE UP (ref 135–145)
WBC # BLD: 7.49 K/UL — SIGNIFICANT CHANGE UP (ref 3.8–10.5)
WBC # FLD AUTO: 7.49 K/UL — SIGNIFICANT CHANGE UP (ref 3.8–10.5)

## 2022-02-09 PROCEDURE — 99233 SBSQ HOSP IP/OBS HIGH 50: CPT

## 2022-02-09 RX ORDER — FUROSEMIDE 40 MG
40 TABLET ORAL ONCE
Refills: 0 | Status: COMPLETED | OUTPATIENT
Start: 2022-02-09 | End: 2022-02-09

## 2022-02-09 RX ORDER — LABETALOL HCL 100 MG
100 TABLET ORAL EVERY 8 HOURS
Refills: 0 | Status: DISCONTINUED | OUTPATIENT
Start: 2022-02-09 | End: 2022-02-11

## 2022-02-09 RX ORDER — POTASSIUM PHOSPHATE, MONOBASIC POTASSIUM PHOSPHATE, DIBASIC 236; 224 MG/ML; MG/ML
15 INJECTION, SOLUTION INTRAVENOUS ONCE
Refills: 0 | Status: COMPLETED | OUTPATIENT
Start: 2022-02-09 | End: 2022-02-09

## 2022-02-09 RX ADMIN — POTASSIUM PHOSPHATE, MONOBASIC POTASSIUM PHOSPHATE, DIBASIC 62.5 MILLIMOLE(S): 236; 224 INJECTION, SOLUTION INTRAVENOUS at 08:23

## 2022-02-09 RX ADMIN — Medication 6 UNIT(S): at 05:27

## 2022-02-09 RX ADMIN — Medication 200 MILLIGRAM(S): at 13:36

## 2022-02-09 RX ADMIN — Medication 6: at 05:27

## 2022-02-09 RX ADMIN — Medication 2 MILLIGRAM(S): at 05:06

## 2022-02-09 RX ADMIN — Medication 40 MILLIGRAM(S): at 11:44

## 2022-02-09 RX ADMIN — ENOXAPARIN SODIUM 40 MILLIGRAM(S): 100 INJECTION SUBCUTANEOUS at 11:01

## 2022-02-09 RX ADMIN — Medication 6 UNIT(S): at 00:17

## 2022-02-09 RX ADMIN — CHLORHEXIDINE GLUCONATE 15 MILLILITER(S): 213 SOLUTION TOPICAL at 05:03

## 2022-02-09 RX ADMIN — PANTOPRAZOLE SODIUM 40 MILLIGRAM(S): 20 TABLET, DELAYED RELEASE ORAL at 11:01

## 2022-02-09 RX ADMIN — INSULIN GLARGINE 25 UNIT(S): 100 INJECTION, SOLUTION SUBCUTANEOUS at 00:16

## 2022-02-09 RX ADMIN — Medication 2: at 11:02

## 2022-02-09 RX ADMIN — Medication 200 MILLIGRAM(S): at 05:03

## 2022-02-09 RX ADMIN — CHLORHEXIDINE GLUCONATE 1 APPLICATION(S): 213 SOLUTION TOPICAL at 05:04

## 2022-02-09 RX ADMIN — Medication 4: at 00:16

## 2022-02-09 RX ADMIN — Medication 6 UNIT(S): at 11:02

## 2022-02-09 RX ADMIN — DEXMEDETOMIDINE HYDROCHLORIDE IN 0.9% SODIUM CHLORIDE 4.87 MICROGRAM(S)/KG/HR: 4 INJECTION INTRAVENOUS at 23:02

## 2022-02-09 RX ADMIN — Medication 100 MILLIGRAM(S): at 22:05

## 2022-02-09 NOTE — PROGRESS NOTE ADULT - SUBJECTIVE AND OBJECTIVE BOX
Patient is a 71y old  Female who presents with a chief complaint of Angioedema (03 Feb 2022 04:41)    BRIEF HOSPITAL COURSE:   71F w/ PMHx HTN, asthma, B/L mastectomy presented on 02/03 w/ acute onset swelling of her tongue and base of mouth. Emergently taken to the OR and underwent nasotracheal intubation by anesthesiology w/ ACS standby     Events last 24 hours:   Remains orotracheally intubated. AC/VC 40%/5+. Sedated w/ Precedex. Afebrile     PAST MEDICAL & SURGICAL HISTORY:  Asthma    Diabetes    Review of Systems:  Unable to assess given sedation       Physical Examination:  ICU Vital Signs Last 24 Hrs  T(C): 36.6 (09 Feb 2022 00:00), Max: 37.8 (08 Feb 2022 13:00)  T(F): 97.9 (09 Feb 2022 00:00), Max: 100 (08 Feb 2022 13:00)  HR: 57 (09 Feb 2022 00:00) (56 - 95)  BP: 161/72 (09 Feb 2022 00:00) (91/48 - 180/79)  BP(mean): 98 (09 Feb 2022 00:00) (62 - 108)  ABP: --  ABP(mean): --  RR: 16 (09 Feb 2022 00:00) (16 - 21)  SpO2: 98% (09 Feb 2022 00:00) (94% - 100%)      Neuro: Sedated and could not perform neuro exam   HEENT: Pupils equal, reactive to light  PULM: Clear to auscultation bilaterally  CVS: Regular rhythm and controlled rate  ABD: Soft, nondistended  EXT: No b/l LE edema  SKIN: Warm and well perfused, no acute rashes     MEDICATIONS  (STANDING):  chlorhexidine 0.12% Liquid 15 milliLiter(s) Oral Mucosa every 12 hours  chlorhexidine 2% Cloths 1 Application(s) Topical <User Schedule>  dexAMETHasone  Injectable 2 milliGRAM(s) IV Push daily  dexMEDEtomidine Infusion 0.2 MICROgram(s)/kG/Hr (4.87 mL/Hr) IV Continuous <Continuous>  dextrose 40% Gel 15 Gram(s) Oral once  dextrose 5%. 1000 milliLiter(s) (50 mL/Hr) IV Continuous <Continuous>  dextrose 5%. 1000 milliLiter(s) (100 mL/Hr) IV Continuous <Continuous>  dextrose 50% Injectable 25 Gram(s) IV Push once  dextrose 50% Injectable 12.5 Gram(s) IV Push once  dextrose 50% Injectable 25 Gram(s) IV Push once  enoxaparin Injectable 40 milliGRAM(s) SubCutaneous daily  insulin glargine Injectable (LANTUS) 25 Unit(s) SubCutaneous at bedtime  insulin lispro (ADMELOG) corrective regimen sliding scale   SubCutaneous every 6 hours  insulin lispro Injectable (ADMELOG) 6 Unit(s) SubCutaneous every 6 hours  labetalol 200 milliGRAM(s) Oral every 8 hours  pantoprazole  Injectable 40 milliGRAM(s) IV Push daily    MEDICATIONS  (PRN):  labetalol Injectable 10 milliGRAM(s) IV Push every 4 hours PRN Systolic blood pressure > 180      I&O's Summary    07 Feb 2022 07:01  -  08 Feb 2022 07:00  --------------------------------------------------------  IN: 728.5 mL / OUT: 1425 mL / NET: -696.5 mL    08 Feb 2022 07:01  -  09 Feb 2022 02:16  --------------------------------------------------------  IN: 610.1 mL / OUT: 1315 mL / NET: -704.9 mL        Mode: AC/ CMV (Assist Control/ Continuous Mandatory Ventilation)  RR (machine): 16  TV (machine): 450  FiO2: 40  PEEP: 5  ITime: 0.9  MAP: 9  PIP: 23      RADIOLOGY/ Microbiology/ Labs: reviewed

## 2022-02-10 LAB
GLUCOSE BLDC GLUCOMTR-MCNC: 114 MG/DL — HIGH (ref 70–99)
GLUCOSE BLDC GLUCOMTR-MCNC: 116 MG/DL — HIGH (ref 70–99)
GLUCOSE BLDC GLUCOMTR-MCNC: 179 MG/DL — HIGH (ref 70–99)
GLUCOSE BLDC GLUCOMTR-MCNC: 192 MG/DL — HIGH (ref 70–99)
GLUCOSE BLDC GLUCOMTR-MCNC: 223 MG/DL — HIGH (ref 70–99)
SARS-COV-2 RNA SPEC QL NAA+PROBE: SIGNIFICANT CHANGE UP

## 2022-02-10 PROCEDURE — 93971 EXTREMITY STUDY: CPT | Mod: 26,RT

## 2022-02-10 PROCEDURE — 99233 SBSQ HOSP IP/OBS HIGH 50: CPT

## 2022-02-10 RX ORDER — ACETAMINOPHEN 500 MG
650 TABLET ORAL EVERY 6 HOURS
Refills: 0 | Status: DISCONTINUED | OUTPATIENT
Start: 2022-02-10 | End: 2022-02-11

## 2022-02-10 RX ORDER — ACETAMINOPHEN 500 MG
1000 TABLET ORAL ONCE
Refills: 0 | Status: COMPLETED | OUTPATIENT
Start: 2022-02-10 | End: 2022-02-10

## 2022-02-10 RX ORDER — SODIUM CHLORIDE 9 MG/ML
1000 INJECTION, SOLUTION INTRAVENOUS
Refills: 0 | Status: DISCONTINUED | OUTPATIENT
Start: 2022-02-10 | End: 2022-02-11

## 2022-02-10 RX ORDER — LIDOCAINE 4 G/100G
1 CREAM TOPICAL EVERY 24 HOURS
Refills: 0 | Status: DISCONTINUED | OUTPATIENT
Start: 2022-02-10 | End: 2022-02-11

## 2022-02-10 RX ORDER — TAMOXIFEN CITRATE 20 MG/1
20 TABLET, FILM COATED ORAL DAILY
Refills: 0 | Status: DISCONTINUED | OUTPATIENT
Start: 2022-02-10 | End: 2022-02-11

## 2022-02-10 RX ORDER — LIDOCAINE 4 G/100G
1 CREAM TOPICAL EVERY 24 HOURS
Refills: 0 | Status: DISCONTINUED | OUTPATIENT
Start: 2022-02-10 | End: 2022-02-10

## 2022-02-10 RX ADMIN — TAMOXIFEN CITRATE 20 MILLIGRAM(S): 20 TABLET, FILM COATED ORAL at 11:36

## 2022-02-10 RX ADMIN — Medication 100 MILLIGRAM(S): at 21:20

## 2022-02-10 RX ADMIN — Medication 650 MILLIGRAM(S): at 10:53

## 2022-02-10 RX ADMIN — SODIUM CHLORIDE 100 MILLILITER(S): 9 INJECTION, SOLUTION INTRAVENOUS at 10:52

## 2022-02-10 RX ADMIN — Medication 1000 MILLIGRAM(S): at 02:15

## 2022-02-10 RX ADMIN — LIDOCAINE 1 PATCH: 4 CREAM TOPICAL at 19:20

## 2022-02-10 RX ADMIN — SODIUM CHLORIDE 100 MILLILITER(S): 9 INJECTION, SOLUTION INTRAVENOUS at 01:46

## 2022-02-10 RX ADMIN — Medication 100 MILLIGRAM(S): at 13:47

## 2022-02-10 RX ADMIN — Medication 2: at 16:29

## 2022-02-10 RX ADMIN — LIDOCAINE 1 PATCH: 4 CREAM TOPICAL at 22:00

## 2022-02-10 RX ADMIN — Medication 400 MILLIGRAM(S): at 14:41

## 2022-02-10 RX ADMIN — Medication 1000 MILLIGRAM(S): at 15:00

## 2022-02-10 RX ADMIN — PANTOPRAZOLE SODIUM 40 MILLIGRAM(S): 20 TABLET, DELAYED RELEASE ORAL at 11:36

## 2022-02-10 RX ADMIN — CHLORHEXIDINE GLUCONATE 1 APPLICATION(S): 213 SOLUTION TOPICAL at 05:28

## 2022-02-10 RX ADMIN — LIDOCAINE 1 PATCH: 4 CREAM TOPICAL at 12:22

## 2022-02-10 RX ADMIN — Medication 2: at 11:35

## 2022-02-10 RX ADMIN — Medication 650 MILLIGRAM(S): at 21:50

## 2022-02-10 RX ADMIN — Medication 100 MILLIGRAM(S): at 05:28

## 2022-02-10 RX ADMIN — Medication 400 MILLIGRAM(S): at 01:45

## 2022-02-10 RX ADMIN — LIDOCAINE 1 PATCH: 4 CREAM TOPICAL at 10:52

## 2022-02-10 RX ADMIN — Medication 650 MILLIGRAM(S): at 11:20

## 2022-02-10 RX ADMIN — INSULIN GLARGINE 25 UNIT(S): 100 INJECTION, SOLUTION SUBCUTANEOUS at 21:23

## 2022-02-10 RX ADMIN — Medication 650 MILLIGRAM(S): at 21:20

## 2022-02-10 RX ADMIN — LIDOCAINE 1 PATCH: 4 CREAM TOPICAL at 01:45

## 2022-02-10 RX ADMIN — ENOXAPARIN SODIUM 40 MILLIGRAM(S): 100 INJECTION SUBCUTANEOUS at 11:36

## 2022-02-10 NOTE — PROVIDER CONTACT NOTE (OTHER) - ACTION/TREATMENT ORDERED:
PA will order US to r/o DVT
as per MD DOWNS no need for assessment at this time.Dayshift will be made aware and reevaluate. ASST Nurse Manager made aware.

## 2022-02-10 NOTE — SWALLOW BEDSIDE ASSESSMENT ADULT - SLP GENERAL OBSERVATIONS
Pt received A&A Ox4, +NGT, SpO2 <94% on RA throughout assessment, 8/10 pain reported to lower back RN Tawnya grady

## 2022-02-10 NOTE — SWALLOW BEDSIDE ASSESSMENT ADULT - SWALLOW EVAL: DIAGNOSIS
Inconsistent piecemeal deglutition suspected throughout assessment; however, deemed grossly functional. Suspect pharyngeal dysphagia with regular solids, thin & mildly thick liquids due to +throat clear post swallow. Pharyngeal stage deemed WFL for puree, soft & bite-sized, easy to chew solids & moderately thick liquids with no overt s/s aspiration noted

## 2022-02-10 NOTE — CHART NOTE - NSCHARTNOTEFT_GEN_A_CORE
Patient complains of severe back pain now and states lidocaine patch not helping much. The pain is 8/10 according to patient. Patient complains of severe back pain now and states lidocaine patch not helping much. The pain is 8/10 according to patient. I visited patient at bed-side and found her lying supine in bed in a rigid -like position and not moving appearing to be in pain. This is pain is not new to her. she has chronic back pain which becomes worse at times, and she said this is one of those times. Patient states the DrKaren said she could have IV Tylenol every 6 hours for the back pain and she is requesting same now. She did receive one 1000Mg dose IV Tylenol earlier today which she said helped her a great deal but the analgesic effect has worn off at this time. I will give another dose IV Tylenol now X one dose for the patients 8/10 back pain.  RN asked to continue to monitor patient and call PA/MD REESEN. Patient complains of severe back pain now and states lidocaine patch not helping much. The pain is 8/10 according to patient. I visited patient at bed-side and found her lying supine in bed in a rigid -like position and not moving appearing to be in pain. This pain is not new to her. she has chronic back pain which becomes worse at times, and she said this is one of those times. Patient states the DrKaren said she could have IV Tylenol every 6 hours for the back pain and she is requesting same now. She did receive one 1000Mg dose IV Tylenol earlier today which she said helped her a great deal but the analgesic effect has worn off at this time. I will give another dose IV Tylenol now X one dose for the patients 8/10 back pain.  RN asked to continue to monitor patient and call PA/MD PRN.

## 2022-02-10 NOTE — CHART NOTE - NSCHARTNOTEFT_GEN_A_CORE
Per day team order, pt to undergo trial of void. RN called due to bladder scan > 400cc.    - Straight cath  - Bladder scan q6hrs  - RN to notify for any changes

## 2022-02-10 NOTE — PROVIDER CONTACT NOTE (OTHER) - REASON
new R arm swelling
Peripheral IV access with levophed running through it does not have blood return and purple discoloration noted distal to site.

## 2022-02-10 NOTE — SWALLOW BEDSIDE ASSESSMENT ADULT - MODE OF PRESENTATION
cup/straw/self fed/fed by clinician spoon/fed by clinician fed by clinician cup/self fed/fed by clinician

## 2022-02-10 NOTE — PROGRESS NOTE ADULT - SUBJECTIVE AND OBJECTIVE BOX
Chief Complaint:  angioedema    SUBJECTIVE / OVERNIGHT EVENTS: No acute events reported overnight.  Pt reports back pain which is chronic.  She denies sob, chest pain, abd pain, N/V, fever, chills, dysuria or any other complaints. All remainder ROS negative.       I&O's Summary    09 Feb 2022 07:01  -  10 Feb 2022 07:00  --------------------------------------------------------  IN: 300 mL / OUT: 2220 mL / NET: -1920 mL          PHYSICAL EXAM:  Vital Signs Last 24 Hrs  T(C): 37.2 (10 Feb 2022 11:12), Max: 38.2 (09 Feb 2022 23:22)  T(F): 99 (10 Feb 2022 11:12), Max: 100.8 (09 Feb 2022 23:22)  HR: 71 (10 Feb 2022 11:12) (62 - 82)  BP: 131/62 (10 Feb 2022 11:12) (93/77 - 169/75)  BP(mean): 91 (10 Feb 2022 00:00) (64 - 97)  RR: 20 (10 Feb 2022 11:12) (14 - 24)  SpO2: 93% (10 Feb 2022 11:12) (93% - 100%)      GENERAL: pt examined bedside, laying comfortably in bed in NAD  HEENT: NC/AT, moist oral mucosa, clear conjunctiva, sclera nonicteric, +NGT  RESPIRATORY: Normal respiratory effort; CTA b/l, no wheezing, rhonchi, rales  CARDIOVASCULAR: RRR, normal S1 and S2, no murmur/rub/gallop  ABDOMEN: soft, NT/ND, normoactive bowel sounds, no rebound/guarding  EXTREMITIES: No cynaosis, no clubbing, RUE edema, Peripheral pulses are 2+ bilaterally  PSYCH: affect appropriate and cooperative  NEUROLOGY: A+O to person, place, and time, no focal neurologic deficits appreciated   SKIN: RUE erythema         LABS:                        12.1   7.49  )-----------( 119      ( 09 Feb 2022 03:52 )             35.2     02-09    139  |  106  |  29.0<H>  ----------------------------<  225<H>  3.9   |  23.0  |  0.52    Ca    7.6<L>      09 Feb 2022 03:52  Phos  2.7     02-09  Mg     2.3     02-09      PT/INR - ( 09 Feb 2022 03:52 )   PT: 14.4 sec;   INR: 1.25 ratio         PTT - ( 09 Feb 2022 03:52 )  PTT:27.8 sec          CAPILLARY BLOOD GLUCOSE      POCT Blood Glucose.: 179 mg/dL (10 Feb 2022 11:11)  POCT Blood Glucose.: 116 mg/dL (10 Feb 2022 05:25)  POCT Blood Glucose.: 114 mg/dL (10 Feb 2022 00:19)  POCT Blood Glucose.: 124 mg/dL (09 Feb 2022 16:28)        RADIOLOGY & ADDITIONAL TESTS:          MEDICATIONS  (STANDING):  chlorhexidine 2% Cloths 1 Application(s) Topical <User Schedule>  dextrose 5% + sodium chloride 0.45%. 1000 milliLiter(s) (100 mL/Hr) IV Continuous <Continuous>  dextrose 50% Injectable 25 Gram(s) IV Push once  dextrose 50% Injectable 12.5 Gram(s) IV Push once  dextrose 50% Injectable 25 Gram(s) IV Push once  enoxaparin Injectable 40 milliGRAM(s) SubCutaneous daily  insulin glargine Injectable (LANTUS) 25 Unit(s) SubCutaneous at bedtime  insulin lispro (ADMELOG) corrective regimen sliding scale   SubCutaneous every 6 hours  insulin lispro Injectable (ADMELOG) 6 Unit(s) SubCutaneous every 6 hours  labetalol 100 milliGRAM(s) Oral every 8 hours  lidocaine   4% Patch 1 Patch Transdermal every 24 hours  pantoprazole  Injectable 40 milliGRAM(s) IV Push daily  tamoxifen 20 milliGRAM(s) Oral daily    MEDICATIONS  (PRN):  acetaminophen     Tablet .. 650 milliGRAM(s) Oral every 6 hours PRN Temp greater or equal to 38C (100.4F), Mild Pain (1 - 3)

## 2022-02-10 NOTE — DIETITIAN NUTRITION RISK NOTIFICATION - ADDITIONAL COMMENTS/DIETITIAN RECOMMENDATIONS
1) If unable to pass SLP eval: initiate Glucerna 1.5 @ 20 ml/hr and advance 10 ml/hr q4 hrs until goal rate of 50 ml/hr (x20 hrs) to provide 1000 ml, 1500 kcal, 83g protein, 759 ml free water, and 100% of RDIs for vitamins/minerals; additional free water per MD discretion.  2) If able to tolerate PO initiate DASH/TLC, consistent CHO diet with Glucerna TID  3) Monitor weights daily for trend/accuracy

## 2022-02-10 NOTE — CHART NOTE - NSCHARTNOTESELECT_GEN_ALL_CORE
Event Note
fam update/Event Note
Event Note
Event Note
Nutrition Services
downgrade/Event Note
family update/Event Note

## 2022-02-10 NOTE — CHART NOTE - NSCHARTNOTEFT_GEN_A_CORE
Source: Patient [ ]  Family [ ]   other [x] Pt lethargic, sleeping in chair    Current Diet: No diet ordered    Current Weight:   (2/3)  214.5 lbs    % Weight Change: No recent weight documented     Pertinent Medications: MEDICATIONS  (STANDING):  chlorhexidine 2% Cloths 1 Application(s) Topical <User Schedule>  dextrose 5% + sodium chloride 0.45%. 1000 milliLiter(s) (100 mL/Hr) IV Continuous <Continuous>  dextrose 50% Injectable 25 Gram(s) IV Push once  dextrose 50% Injectable 12.5 Gram(s) IV Push once  dextrose 50% Injectable 25 Gram(s) IV Push once  enoxaparin Injectable 40 milliGRAM(s) SubCutaneous daily  insulin glargine Injectable (LANTUS) 25 Unit(s) SubCutaneous at bedtime  insulin lispro (ADMELOG) corrective regimen sliding scale   SubCutaneous every 6 hours  insulin lispro Injectable (ADMELOG) 6 Unit(s) SubCutaneous every 6 hours  labetalol 100 milliGRAM(s) Oral every 8 hours  lidocaine   4% Patch 1 Patch Transdermal every 24 hours  pantoprazole  Injectable 40 milliGRAM(s) IV Push daily  tamoxifen 20 milliGRAM(s) Oral daily    MEDICATIONS  (PRN):  acetaminophen     Tablet .. 650 milliGRAM(s) Oral every 6 hours PRN Temp greater or equal to 38C (100.4F), Mild Pain (1 - 3)    Pertinent Labs: CBC Full  -  ( 09 Feb 2022 03:52 )  WBC Count : 7.49 K/uL  RBC Count : 3.74 M/uL  Hemoglobin : 12.1 g/dL  Hematocrit : 35.2 %  Platelet Count - Automated : 119 K/uL  Mean Cell Volume : 94.1 fl  Mean Cell Hemoglobin : 32.4 pg  Mean Cell Hemoglobin Concentration : 34.4 gm/dL    Skin: 1+ dependent, 4+ R arm     Nutrition focused physical exam conducted - found signs of malnutrition [x]absent [ ]present    Subcutaneous fat loss: [ ] Orbital fat pads region, [ ]Buccal fat region, [ ]Triceps region,  [ ]Ribs region    Muscle wasting: [ ]Temples region, [ ]Clavicle region, [ ]Shoulder region, [ ]Scapula region, [ ]Interosseous region,  [ ]thigh region, [ ]Calf region    Estimated Needs:   [x] no change since previous assessment  [ ] recalculated:     Current Nutrition Diagnosis: Pt presents at high nutrition risk secondary to malnutrition (moderate acute) related to inability to meet sufficient protein-energy needs in setting of angioedema 2/2 allergic reaction, extubation with re-intubation (now extubated) as evidenced by meeting <50% EER x 7 days, mild edema. Pt failed bedside swallow eval, SLP pending. Pt lethargic unable to remain awake for interview at this time. NGT in place for meds.    Recommendations:   1) If unable to pass SLP eval: initiate Glucerna 1.5 @ 20 ml/hr and advance 10 ml/hr q4 hrs until goal rate of 50 ml/hr (x20 hrs) to provide 1000 ml, 1500 kcal, 83g protein, 759 ml free water, and 100% of RDIs for vitamins/minerals; additional free water per MD discretion.  2) If able to tolerate PO initiate DASH/TLC, consistent CHO diet with Glucerna TID  3) Monitor weights daily for trend/accuracy      Monitoring and Evaluation:   [ ] PO intake [x ] Tolerance to diet prescription [X] Weights  [X] Follow up per protocol [X] Labs:

## 2022-02-10 NOTE — PHYSICAL THERAPY INITIAL EVALUATION ADULT - PERTINENT HX OF CURRENT PROBLEM, REHAB EVAL
72 y/o F w/ PMH of breast CA s/p b/l mastectomy, HTN (on ACEI), obesity presented to [facility] 2/3/22 c/o tongue and mandibular swelling requiring emergent intubation for respiratory distress and airway compromise now s/p extubation for angioedema and admitted to MICU and since downgraded medicine.

## 2022-02-10 NOTE — SWALLOW BEDSIDE ASSESSMENT ADULT - SWALLOW EVAL: RECOMMENDED FEEDING/EATING TECHNIQUES
slow rate of intake/maintain upright posture during/after eating for 30 mins/oral hygiene/position upright (90 degrees)/small sips/bites

## 2022-02-10 NOTE — SWALLOW BEDSIDE ASSESSMENT ADULT - SLP PERTINENT HISTORY OF CURRENT PROBLEM
As per charting, "72 y/o F w/ PMH of breast CA s/p b/l mastectomy, HTN (on ACEI), obesity presented to [facility] 2/3/22 c/o tongue and mandibular swelling requiring emergent intubation for respiratory distress and airway compromise now s/p extubation for angioedema and admitted to MICU and since downgraded medicine."

## 2022-02-10 NOTE — PROVIDER CONTACT NOTE (OTHER) - SITUATION
Peripheral IV access with levophed running through it does not have blood return and purple and bluish bruising discoloration noted distal to site.
patient with new R arm swelling x past few hours  h/o b/l mastectomy

## 2022-02-10 NOTE — PHYSICAL THERAPY INITIAL EVALUATION ADULT - ADDITIONAL COMMENTS
pt states she lives alone in a 1-story house with no steps to enter and none inside. pt independent with mobility prior to admit. uses cane occasionally for back pain. cares for granddaughter.

## 2022-02-10 NOTE — PROGRESS NOTE ADULT - SUBJECTIVE AND OBJECTIVE BOX
Hospitalist Note/MICU DG    CC: Tongue Swelling    HPI/Interval Events: 71F with PMHX BRCA s/p BL Mastectomy, HTN on ACEI, Obesity, presents to [facility] 2/3/22 c/o tongue and mandibular swelling requiring emergent nasotracheal intubation x2 for respiratory distress and airway compromise now s/p extubation x2 for angioedema. Treated with IV abx Meropenem x5 days for infection. Given Epi/Benadryl/Pepcid now held. Develoepd R pupil dilatation likely from epinephrine use. CTH WO and EEG and Neuro workup negative. s/p Decadron IV for angioedema now tapered off. Patient seen/examined. C/o dry mouth and LBP. Stable for downgrade to hospitalist service.     PMHX: BRCA s/p BL Mastectomy, HTN on ACEI, Obesity, Asthma  PSHX: BL Mastectomy  Social Hx: Denies etoh/tobacco/druga buse  FamHx: no fam hx HTN  Allergies: See EMR for extensive allergy list    Vital Signs Last 24 Hrs  T(C): 38.2 (09 Feb 2022 23:22), Max: 38.2 (09 Feb 2022 23:22)  T(F): 100.8 (09 Feb 2022 23:22), Max: 100.8 (09 Feb 2022 23:22)  HR: 77 (10 Feb 2022 00:00) (56 - 82)  BP: 134/80 (10 Feb 2022 00:00) (93/77 - 193/88)  BP(mean): 91 (10 Feb 2022 00:00) (64 - 156)  RR: 17 (10 Feb 2022 00:00) (14 - 24)  SpO2: 93% (10 Feb 2022 00:00) (93% - 100%)    Constitutional: Pale, Obese, NAD, VSS  Head: NC/AT +NGT in place  Eyes: PERRL, EOMI, anicteric sclera, conjunctiva WNL  ENT: Normal Pharynx, No tonsillar exudate/erythema, +Dry Oral Mucosa  Neck: Supple, Non-tender  Chest: Non-tender, no rashes  Cardio: RRR, s1/s2, no appreciable murmurs/rubs/gallops  Resp: BS CTA bilaterally, no wheezing/rhonchi/rales  Abd: Soft, Non-tender, Non-distended, no rebound/guarding/rigidity  : not examined  Rectal: not examined  MSK: moving all extremities, no motor weakness, full ROM x4  Ext: palpable distal pulses, good capillary refill  Psych: appropriate, cooperative  Neuro: CN II-XII grossly intact, no focal deficits  Skin: Warm/Dry. No rashes. Hospitalist Note/MICU DG    CC: Tongue Swelling    HPI/Interval Events: 71F with PMHX BRCA s/p BL Mastectomy, HTN on ACEI, Obesity, presents to [facility] 2/3/22 c/o tongue and mandibular swelling requiring emergent nasotracheal intubation x2 for respiratory distress and airway compromise now s/p extubation x2 for angioedema. Treated with IV abx Meropenem x5 days for infection. Given Epi/Benadryl/Pepcid now held. Develoepd R pupil dilatation likely from epinephrine use. CTH WO and EEG and Neuro workup negative. s/p Decadron IV for angioedema now tapered off. Patient seen/examined. C/o dry mouth and LBP. Stable for downgrade to hospitalist service.     ROS negative unless mentioned.    PMHX: BRCA s/p BL Mastectomy, HTN on ACEI, Obesity, Asthma  PSHX: BL Mastectomy  Social Hx: Denies etoh/tobacco/druga buse  FamHx: no fam hx HTN  Allergies: See EMR for extensive allergy list    Vital Signs Last 24 Hrs  T(C): 38.2 (09 Feb 2022 23:22), Max: 38.2 (09 Feb 2022 23:22)  T(F): 100.8 (09 Feb 2022 23:22), Max: 100.8 (09 Feb 2022 23:22)  HR: 77 (10 Feb 2022 00:00) (56 - 82)  BP: 134/80 (10 Feb 2022 00:00) (93/77 - 193/88)  BP(mean): 91 (10 Feb 2022 00:00) (64 - 156)  RR: 17 (10 Feb 2022 00:00) (14 - 24)  SpO2: 93% (10 Feb 2022 00:00) (93% - 100%)    Constitutional: Pale, Obese, NAD, VSS  Head: NC/AT +NGT in place  Eyes: PERRL, EOMI, anicteric sclera, conjunctiva WNL  ENT: Normal Pharynx, No tonsillar exudate/erythema, +Dry Oral Mucosa  Neck: Supple, Non-tender  Chest: Non-tender, no rashes  Cardio: RRR, s1/s2, no appreciable murmurs/rubs/gallops  Resp: BS CTA bilaterally, no wheezing/rhonchi/rales  Abd: Soft, Non-tender, Non-distended, no rebound/guarding/rigidity  : not examined  Rectal: not examined  MSK: moving all extremities, no motor weakness, full ROM x4  Ext: palpable distal pulses, good capillary refill  Psych: appropriate, cooperative  Neuro: CN II-XII grossly intact, no focal deficits  Skin: Warm/Dry. No rashes.

## 2022-02-11 ENCOUNTER — TRANSCRIPTION ENCOUNTER (OUTPATIENT)
Age: 72
End: 2022-02-11

## 2022-02-11 VITALS
TEMPERATURE: 98 F | HEART RATE: 65 BPM | RESPIRATION RATE: 18 BRPM | DIASTOLIC BLOOD PRESSURE: 85 MMHG | SYSTOLIC BLOOD PRESSURE: 166 MMHG | OXYGEN SATURATION: 96 %

## 2022-02-11 LAB
GLUCOSE BLDC GLUCOMTR-MCNC: 114 MG/DL — HIGH (ref 70–99)
GLUCOSE BLDC GLUCOMTR-MCNC: 161 MG/DL — HIGH (ref 70–99)
GLUCOSE BLDC GLUCOMTR-MCNC: 174 MG/DL — HIGH (ref 70–99)
GLUCOSE BLDC GLUCOMTR-MCNC: 184 MG/DL — HIGH (ref 70–99)

## 2022-02-11 PROCEDURE — 80048 BASIC METABOLIC PNL TOTAL CA: CPT

## 2022-02-11 PROCEDURE — 84145 PROCALCITONIN (PCT): CPT

## 2022-02-11 PROCEDURE — 99239 HOSP IP/OBS DSCHRG MGMT >30: CPT

## 2022-02-11 PROCEDURE — 95711 VEEG 2-12 HR UNMONITORED: CPT

## 2022-02-11 PROCEDURE — 86901 BLOOD TYPING SEROLOGIC RH(D): CPT

## 2022-02-11 PROCEDURE — U0005: CPT

## 2022-02-11 PROCEDURE — 36600 WITHDRAWAL OF ARTERIAL BLOOD: CPT

## 2022-02-11 PROCEDURE — 70450 CT HEAD/BRAIN W/O DYE: CPT

## 2022-02-11 PROCEDURE — 85730 THROMBOPLASTIN TIME PARTIAL: CPT

## 2022-02-11 PROCEDURE — 84132 ASSAY OF SERUM POTASSIUM: CPT

## 2022-02-11 PROCEDURE — 95714 VEEG EA 12-26 HR UNMNTR: CPT

## 2022-02-11 PROCEDURE — 80053 COMPREHEN METABOLIC PANEL: CPT

## 2022-02-11 PROCEDURE — 85027 COMPLETE CBC AUTOMATED: CPT

## 2022-02-11 PROCEDURE — 93971 EXTREMITY STUDY: CPT

## 2022-02-11 PROCEDURE — 85014 HEMATOCRIT: CPT

## 2022-02-11 PROCEDURE — 36415 COLL VENOUS BLD VENIPUNCTURE: CPT

## 2022-02-11 PROCEDURE — 83605 ASSAY OF LACTIC ACID: CPT

## 2022-02-11 PROCEDURE — 84295 ASSAY OF SERUM SODIUM: CPT

## 2022-02-11 PROCEDURE — 94003 VENT MGMT INPAT SUBQ DAY: CPT

## 2022-02-11 PROCEDURE — 82435 ASSAY OF BLOOD CHLORIDE: CPT

## 2022-02-11 PROCEDURE — 96375 TX/PRO/DX INJ NEW DRUG ADDON: CPT

## 2022-02-11 PROCEDURE — 87637 SARSCOV2&INF A&B&RSV AMP PRB: CPT

## 2022-02-11 PROCEDURE — 85610 PROTHROMBIN TIME: CPT

## 2022-02-11 PROCEDURE — 95700 EEG CONT REC W/VID EEG TECH: CPT

## 2022-02-11 PROCEDURE — 85018 HEMOGLOBIN: CPT

## 2022-02-11 PROCEDURE — U0003: CPT

## 2022-02-11 PROCEDURE — 71045 X-RAY EXAM CHEST 1 VIEW: CPT

## 2022-02-11 PROCEDURE — 85025 COMPLETE CBC W/AUTO DIFF WBC: CPT

## 2022-02-11 PROCEDURE — 94002 VENT MGMT INPAT INIT DAY: CPT

## 2022-02-11 PROCEDURE — 36430 TRANSFUSION BLD/BLD COMPNT: CPT

## 2022-02-11 PROCEDURE — P9059: CPT

## 2022-02-11 PROCEDURE — 84100 ASSAY OF PHOSPHORUS: CPT

## 2022-02-11 PROCEDURE — 96374 THER/PROPH/DIAG INJ IV PUSH: CPT

## 2022-02-11 PROCEDURE — 86850 RBC ANTIBODY SCREEN: CPT

## 2022-02-11 PROCEDURE — 83735 ASSAY OF MAGNESIUM: CPT

## 2022-02-11 PROCEDURE — 96372 THER/PROPH/DIAG INJ SC/IM: CPT | Mod: XU

## 2022-02-11 PROCEDURE — 70490 CT SOFT TISSUE NECK W/O DYE: CPT

## 2022-02-11 PROCEDURE — 82330 ASSAY OF CALCIUM: CPT

## 2022-02-11 PROCEDURE — 86803 HEPATITIS C AB TEST: CPT

## 2022-02-11 PROCEDURE — 82803 BLOOD GASES ANY COMBINATION: CPT

## 2022-02-11 PROCEDURE — 84484 ASSAY OF TROPONIN QUANT: CPT

## 2022-02-11 PROCEDURE — 82962 GLUCOSE BLOOD TEST: CPT

## 2022-02-11 PROCEDURE — 86900 BLOOD TYPING SEROLOGIC ABO: CPT

## 2022-02-11 PROCEDURE — 83036 HEMOGLOBIN GLYCOSYLATED A1C: CPT

## 2022-02-11 PROCEDURE — 71250 CT THORAX DX C-: CPT

## 2022-02-11 PROCEDURE — 97163 PT EVAL HIGH COMPLEX 45 MIN: CPT

## 2022-02-11 PROCEDURE — 71260 CT THORAX DX C+: CPT

## 2022-02-11 PROCEDURE — 70491 CT SOFT TISSUE NECK W/DYE: CPT

## 2022-02-11 PROCEDURE — 92610 EVALUATE SWALLOWING FUNCTION: CPT

## 2022-02-11 PROCEDURE — 83690 ASSAY OF LIPASE: CPT

## 2022-02-11 PROCEDURE — 87040 BLOOD CULTURE FOR BACTERIA: CPT

## 2022-02-11 PROCEDURE — 82947 ASSAY GLUCOSE BLOOD QUANT: CPT

## 2022-02-11 PROCEDURE — 99291 CRITICAL CARE FIRST HOUR: CPT | Mod: 25

## 2022-02-11 RX ORDER — ACETAMINOPHEN 500 MG
2 TABLET ORAL
Qty: 0 | Refills: 0 | DISCHARGE
Start: 2022-02-11

## 2022-02-11 RX ORDER — LIDOCAINE 4 G/100G
1 CREAM TOPICAL
Qty: 0 | Refills: 0 | DISCHARGE
Start: 2022-02-11

## 2022-02-11 RX ORDER — LANOLIN ALCOHOL/MO/W.PET/CERES
3 CREAM (GRAM) TOPICAL AT BEDTIME
Refills: 0 | Status: DISCONTINUED | OUTPATIENT
Start: 2022-02-11 | End: 2022-02-11

## 2022-02-11 RX ORDER — LISINOPRIL 2.5 MG/1
1 TABLET ORAL
Qty: 0 | Refills: 0 | DISCHARGE

## 2022-02-11 RX ORDER — LABETALOL HCL 100 MG
1 TABLET ORAL
Qty: 0 | Refills: 0 | DISCHARGE
Start: 2022-02-11

## 2022-02-11 RX ORDER — PANTOPRAZOLE SODIUM 20 MG/1
40 TABLET, DELAYED RELEASE ORAL
Qty: 0 | Refills: 0 | DISCHARGE
Start: 2022-02-11

## 2022-02-11 RX ORDER — LANOLIN ALCOHOL/MO/W.PET/CERES
1 CREAM (GRAM) TOPICAL
Qty: 0 | Refills: 0 | DISCHARGE
Start: 2022-02-11

## 2022-02-11 RX ADMIN — Medication 6 UNIT(S): at 12:37

## 2022-02-11 RX ADMIN — Medication 650 MILLIGRAM(S): at 05:40

## 2022-02-11 RX ADMIN — LIDOCAINE 1 PATCH: 4 CREAM TOPICAL at 10:57

## 2022-02-11 RX ADMIN — Medication 100 MILLIGRAM(S): at 15:54

## 2022-02-11 RX ADMIN — Medication 650 MILLIGRAM(S): at 05:10

## 2022-02-11 RX ADMIN — Medication 650 MILLIGRAM(S): at 15:54

## 2022-02-11 RX ADMIN — PANTOPRAZOLE SODIUM 40 MILLIGRAM(S): 20 TABLET, DELAYED RELEASE ORAL at 12:40

## 2022-02-11 RX ADMIN — SODIUM CHLORIDE 100 MILLILITER(S): 9 INJECTION, SOLUTION INTRAVENOUS at 10:56

## 2022-02-11 RX ADMIN — Medication 2: at 09:06

## 2022-02-11 RX ADMIN — Medication 100 MILLIGRAM(S): at 05:09

## 2022-02-11 RX ADMIN — Medication 2: at 12:36

## 2022-02-11 RX ADMIN — Medication 3 MILLIGRAM(S): at 02:20

## 2022-02-11 RX ADMIN — CHLORHEXIDINE GLUCONATE 1 APPLICATION(S): 213 SOLUTION TOPICAL at 06:15

## 2022-02-11 RX ADMIN — ENOXAPARIN SODIUM 40 MILLIGRAM(S): 100 INJECTION SUBCUTANEOUS at 12:38

## 2022-02-11 RX ADMIN — TAMOXIFEN CITRATE 20 MILLIGRAM(S): 20 TABLET, FILM COATED ORAL at 15:53

## 2022-02-11 NOTE — PROGRESS NOTE ADULT - SUBJECTIVE AND OBJECTIVE BOX
Chief Complaint:  angioedema    SUBJECTIVE / OVERNIGHT EVENTS: No acute events reported overnight.  Passed swallow eval and off supplemental O2.  Pt reported to be retaining overnight, straight cath'd x2.  She denies sob, chest pain, abd pain, N/V, fever, chills, dysuria or any other complaints. All remainder ROS negative.         PHYSICAL EXAM:  Vital Signs Last 24 Hrs  T(C): 37.2 (10 Feb 2022 11:12), Max: 38.2 (09 Feb 2022 23:22)  T(F): 99 (10 Feb 2022 11:12), Max: 100.8 (09 Feb 2022 23:22)  HR: 71 (10 Feb 2022 11:12) (62 - 82)  BP: 131/62 (10 Feb 2022 11:12) (93/77 - 169/75)  BP(mean): 91 (10 Feb 2022 00:00) (64 - 97)  RR: 20 (10 Feb 2022 11:12) (14 - 24)  SpO2: 93% (10 Feb 2022 11:12) (93% - 100%)      GENERAL: pt examined bedside, laying comfortably in bed in NAD  HEENT: NC/AT, moist oral mucosa, clear conjunctiva, sclera nonicteric, +NGT  RESPIRATORY: Normal respiratory effort; CTA b/l, no wheezing, rhonchi, rales  CARDIOVASCULAR: RRR, normal S1 and S2, no murmur/rub/gallop  ABDOMEN: soft, NT/ND, normoactive bowel sounds, no rebound/guarding  EXTREMITIES: No cynaosis, no clubbing, RUE edema, Peripheral pulses are 2+ bilaterally  PSYCH: affect appropriate and cooperative  NEUROLOGY: A+O to person, place, and time, no focal neurologic deficits appreciated   SKIN: RUE erythema         LABS:                    - Pending       CAPILLARY BLOOD GLUCOSE      POCT Blood Glucose.: 179 mg/dL (10 Feb 2022 11:11)  POCT Blood Glucose.: 116 mg/dL (10 Feb 2022 05:25)  POCT Blood Glucose.: 114 mg/dL (10 Feb 2022 00:19)  POCT Blood Glucose.: 124 mg/dL (09 Feb 2022 16:28)        RADIOLOGY & ADDITIONAL TESTS:          MEDICATIONS  (STANDING):  chlorhexidine 2% Cloths 1 Application(s) Topical <User Schedule>  dextrose 5% + sodium chloride 0.45%. 1000 milliLiter(s) (100 mL/Hr) IV Continuous <Continuous>  dextrose 50% Injectable 25 Gram(s) IV Push once  dextrose 50% Injectable 12.5 Gram(s) IV Push once  dextrose 50% Injectable 25 Gram(s) IV Push once  enoxaparin Injectable 40 milliGRAM(s) SubCutaneous daily  insulin glargine Injectable (LANTUS) 25 Unit(s) SubCutaneous at bedtime  insulin lispro (ADMELOG) corrective regimen sliding scale   SubCutaneous every 6 hours  insulin lispro Injectable (ADMELOG) 6 Unit(s) SubCutaneous every 6 hours  labetalol 100 milliGRAM(s) Oral every 8 hours  lidocaine   4% Patch 1 Patch Transdermal every 24 hours  pantoprazole  Injectable 40 milliGRAM(s) IV Push daily  tamoxifen 20 milliGRAM(s) Oral daily    MEDICATIONS  (PRN):  acetaminophen     Tablet .. 650 milliGRAM(s) Oral every 6 hours PRN Temp greater or equal to 38C (100.4F), Mild Pain (1 - 3)

## 2022-02-11 NOTE — DISCHARGE NOTE NURSING/CASE MANAGEMENT/SOCIAL WORK - PATIENT PORTAL LINK FT
You can access the FollowMyHealth Patient Portal offered by [facility] by registering at the following website: [email or web address]. By joining [facility]’s FollowMyHealth portal, you will also be able to view your health information using other applications (apps) compatible with our system.

## 2022-02-11 NOTE — PROGRESS NOTE ADULT - NUTRITIONAL ASSESSMENT
This patient has been assessed with a concern for Malnutrition and has been determined to have a diagnosis/diagnoses of Moderate protein-calorie malnutrition.      
This patient has been assessed with a concern for Malnutrition and has been determined to have a diagnosis/diagnoses of Moderate protein-calorie malnutrition.    This patient is being managed with:   Diet Easy to Chew-  Moderately Thick Liquids (MODTHICKLIQS)  Entered: Feb 10 2022  5:36PM

## 2022-02-11 NOTE — DISCHARGE NOTE PROVIDER - NSDCMRMEDTOKEN_GEN_ALL_CORE_FT
aspirin 81 mg oral delayed release tablet: 1 tab(s) orally once a day  cyclobenzaprine 5 mg oral tablet: 1 tab(s) orally 3 times a day, As Needed  Flonase 50 mcg/inh nasal spray: 1 spray(s) nasal once a day  lisinopril 5 mg oral tablet: 1 tab(s) orally once a day  tamoxifen 20 mg oral tablet: 1 tab(s) orally once a day   acetaminophen 325 mg oral tablet: 2 tab(s) orally every 6 hours, As needed, Temp greater or equal to 38C (100.4F), Mild Pain (1 - 3)  aspirin 81 mg oral delayed release tablet: 1 tab(s) orally once a day  cyclobenzaprine 5 mg oral tablet: 1 tab(s) orally 3 times a day, As Needed  Flonase 50 mcg/inh nasal spray: 1 spray(s) nasal once a day  labetalol 100 mg oral tablet: 1 tab(s) orally every 8 hours  lidocaine 4% topical film: Apply topically to affected area every 24 hours, As Needed  melatonin 3 mg oral tablet: 1 tab(s) orally once a day (at bedtime)  pantoprazole 40 mg intravenous injection: 40 milligram(s) intravenous once a day  tamoxifen 20 mg oral tablet: 1 tab(s) orally once a day

## 2022-02-11 NOTE — DISCHARGE NOTE NURSING/CASE MANAGEMENT/SOCIAL WORK - NSDCPEFALRISK_GEN_ALL_CORE
For information on Fall & Injury Prevention, visit: [email or web address] OR  [email or web address] OR  https://www.cdc.gov/steadi/patient.html

## 2022-02-11 NOTE — DISCHARGE NOTE PROVIDER - DETAILS OF MALNUTRITION DIAGNOSIS/DIAGNOSES
This patient has been assessed with a concern for Malnutrition and was treated during this hospitalization for the following Nutrition diagnosis/diagnoses:     -  02/10/2022: Moderate protein-calorie malnutrition

## 2022-02-11 NOTE — PROGRESS NOTE ADULT - ASSESSMENT
71F w/ PMHx HTN, asthma, B/L mastectomy presented on 02/03 w/ acute onset swelling of her tongue and base of mouth, underwent awake fiberoptic nasotracheal intubation on 02/03, extubated on 02/07 and reintubated later that day orotracheally for AMS/ airway protection     Angioedema in setting of ACEI  Acute hypoxic respiratory failure    Neuro: Maintain RAAS -3 to -5, sedated w/ Precedex. SAT/SBT daily    Cardiovascular: Aim for MAP target 65. Off pressors. Stopped Lisinopril. On labetalol 200mg q8  Resp/Chest: Maintain SpO2 > 92%. s/p nasotracheal intubation 02/03 and later orotracheal intubation on 02/07 for airway protection. On Volume AC 40/5. Vent bundle. EtCO2 monitoring  GI/Nutrition: Diet: On tube feeds and IV PPI  ID: Completed short course of ABx, BCx w/ NGTD   Renal: Avoid nephrotoxic agents. Strict I&O.  Endocrinology: Maintain Blood sugar < 180. ISS as per protocol  Haem/Oncology:  DVT ppx w/ Lovenox  Allergy/ immunology: Added Lisinopril to her list of extensive allergy's. On IV steroid taper, off H1/H2 blockers    Code status: Full  Line/tubes/ drains: ETT: 02/07    Critical Care time: 35 minutes assessing presenting problems of acute illness that poses high probability of life threatening deterioration or end organ damage/dysfunction.  Medical decision making including Initiating plan of care, reviewing data, reviewing radiology, discussing with multidisciplinary team, non inclusive of procedures    
71F w/ PMHx HTN, asthma, B/L mastectomy presented on 02/03 w/ acute onset swelling of her tongue and base of mouth.  w/ ACS standby     Angioedema in setting of ACEI  Acute hypoxic respiratory failure  Distributive shock   Critical airway     Neuro: Critical airway, maintain RAAS -3 to -5, sedated w/ veresed and fentanyl w/ PRN versed. No SAT/SBT untill swelling improves   Cardiovascular: Aim for MAP target 65. On low dose pressors. Stopped Lisinopril  Resp/Chest: Maintain SpO2 > 92%. s/p fiberoptic nasotracheal intubation 02/03 by anesthesiology. On Volume AC 40/5. Hold off on weaning. Vent bundle. EtCO2 monitoring  GI/Nutrition: Diet: Keep NPO. IV PPI and continue IV hydration   ID: Started on Merrem 2g q8 (PCN allergy). f/u BCx and deescalate. Trend LA  Renal: Avoid nephrotoxic agents. Strict I&O.  Endocrinology: Maintain Blood sugar < 180. ISS as per protocol  Haem/Oncology:  DVT ppx w/ Lovenox  Allergy/ immunology: Stopped Lisinopril and added to her list of extensive allergy's. CT neck w/ extensive submandibular swelling. Given FFP x 3 units in addition to IV steroids, H1/H2 blockers and on empiric Abx. Seen by ENT and thinks angioedema likely, Check leak test after tongue edema subsides   Code status: Full  Line/tubes/ drains: Nasotracheal 6.5 ETT: 02/03    Critical Care time: 35 minutes assessing presenting problems of acute illness that poses high probability of life threatening deterioration or end organ damage/dysfunction.  Medical decision making including Initiating plan of care, reviewing data, reviewing radiology, discussing with multidisciplinary team, non inclusive of procedures    
71F w/ PMHx HTN, asthma, B/L mastectomy presented on 02/03 w/ acute onset swelling of her tongue and base of mouth.  w/ ACS standby     Angioedema in setting of ACEI  Acute hypoxic respiratory failure  Distributive shock   Critical airway     Neuro: Critical airway, maintain RAAS -3 to -5, sedated w/ veresed and fentanyl w/ PRN versed. No SAT/SBT untill swelling improves   Cardiovascular: Aim for MAP target 65. On low dose pressors. Stopped Lisinopril and added to her list of extensive allergy's  Resp/Chest: Maintain SpO2 > 92%. Underwent awake fiberoptic nasotracheal intubation 02/03 by anesthesiology. On Volume AC 40/5. Will hold off on weaning. Vent bundle. EtCO2 monitoring  GI/Nutrition: Diet: Keep NPO for now. IV PPI and continue IV hydration   ID: Started on Merrem 2g q8 (PCN allergy). f/u BCx and deescalate. Trend LA  Renal: Avoid nephrotoxic agents. Strict I&O.  Endocrinology: Maintain Blood sugar < 180. ISS as per protocol  Haem/Oncology:  DVT ppx w/ Lovenox  Allergy/ immunology: Stopped Lisinopril and added to her list of extensive allergy's. CT neck w/ extensive submandibular swelling. Given FFP x 3 units in addition to IV steroids, H1/H2 blockers and on empiric Abx cover. Seen by ENT and thinks angioedema likely, Check leak test after tongue edema subsides   Code status: Full  Line/tubes/ drains: Nasotracheal 6.5 ETT: 02/03    Critical Care time: 35 minutes assessing presenting problems of acute illness that poses high probability of life threatening deterioration or end organ damage/dysfunction.  Medical decision making including Initiating plan of care, reviewing data, reviewing radiology, discussing with multidisciplinary team, non inclusive of procedures    
72 y/o F w/ PMH of breast CA s/p b/l mastectomy, HTN (on ACEI), obesity presented to [facility] 2/3/22 c/o tongue and mandibular swelling requiring emergent intubation for respiratory distress and airway compromise now s/p extubation for angioedema and admitted to MICU and since downgraded medicine.      Angioedema 2/2 ACEI  - s/p emergent intubation/extubation x2 for airway compromise and shock  - Shock resolved and completed 5d course of Merrem   - Titrated off supplemental O2 and maintaining O2 sats >95%  - s/p Decacadron IV and benadryl/Pepcid   - Passed swallow eval, diet started and tolerating well.  Will remove NGT.       Acute onset RUE edema, superficial thrombophlebitis  - RUE US reporting Rt cephalic vein superficial thrombophlebitis  - Supportive care w/ arm elevation and warm compresses      Thrombocytopenia   - Etiology unclear and has been fluctuating  - AM labs pending to reassess  - Could be medication induced and if drops further consider switching protonix to pepcid       Pupil Dilatation  - Neurologic exam intact and dilation improving  - CTH (-), EEG (-)   - Likely dilated 2/2 Epi administration into IJ vein       Chronic Low Back Pain  - Avoid NSAIDs for now  - Ofirmev 1g IV x1  - Lidocaine Patch q24      Essential HTN  - Off ACEI and added to Allergy List  - Maintain on Labetalol 100mg TID      DM II w/ hyperglycemia  - A1c 8.2  - On basal/bolus insulin regimen  - Titrate to maintain BG<180  - ISS and hypoglycemic protocol       Hx of BRCA  - s/p BL Mastectomy  - On Tamoxifen 20mg PO q24      Hx of Asthma  - Not in acute exacerbation at this time  - PRN bronchodilators       GERD  - Protonix       VTE ppx: Lovenox      Dispo: PT eval noted and recommending [facility]. 
ASSESSMENT:  71F with PMHX BRCA s/p BL Mastectomy, HTN on ACEI, Obesity, presents to [facility] 2/3/22 c/o tongue and mandibular swelling requiring emergent nasotracheal intubation x2 for respiratory distress and airway compromise now s/p extubation x2 for angioedema and stable for downgrade to hospitalist service.    PLAN:  Angioedema  -s/p emergent intubation/extubation x2 for airway compromise c/b shock  -continue O2 via NC titrate for SPO2 92-98  -s/p Decacadron IV   -s/p Meropenem x5d  -Off Vasopressors  -Benadryl/Pepcid dc'd  -ACEI added to Allergy List  -Maintain NGT for meds  -NPO failed bedside awaiting formal speech eval  -start D5/0.45NSS 100cc/hr while NPO for dry oral mucosa  -GI PPX PPI  -VTE PPX Lovenox    Pupil Dilatation  -CTH negative. EEG Negative. Neuro exam intact.  -Likely dilated 2/2 Epi administration into IJ Vein.     Chronic Low Back Pain  -Avoid NSAIDs for now  -Ofirmev 1g IV x1  -Lidocaine Patch q24    HTN  -ACEI added to Allergy List  -Labetalol 100mg TID    GERD  -Protonix 40mg q24    Hyperglycemia, DM  -Lantus 25 units QHS  -Lispro 6 units q6  -Monitor Accuchecks/ISS    BRCA  -s/p BL Mastectomy  -restart Tamoxifen 20mg PO q24    Asthma  -Titrate FIO2/LPM. Monitor Airway.
71F w/ PMHx HTN, asthma, B/L mastectomy presented on 02/03 w/ acute onset swelling of her tongue and base of mouth.  w/ ACS standby     Cristian's angina vs angioedema in setting of ACEI also in differential   Acute hypoxic respiratory failure  Distributive shock   Critical airway     Neuro: Critical airway, maintain RAAS -3 to -5, sedated w/ propofol and fentanyl w/ PRN versed. No SAT/SBT untill swelling improves   Cardiovascular: Aim for MAP target 65. On low dose pressors. Stopped Lisinopril and added to her list of extensive allergy's  Resp/Chest: Maintain SpO2 > 92%. Underwent awake fiberoptic nasotracheal intubation by anesthesiology. On Volume AC 40/5. Will hold off on weaning for now. Vent bundle. EtCO2 monitoring  GI/Nutrition: Diet: Keep NPO for now. IV PPI. Will need to attempt Dobhoff in 48hrs if swelling improves   ID: Started on Merrem 2g q8 (PCN allergy). f/u BCx. Trend LA  Renal: Avoid nephrotoxic agents. Strict I&O  Endocrinology: Maintain Blood sugar < 180. ISS as per protocol  Haem/Oncology:  DVT ppx w/ Lovenox  Allergy/ immunology: Stopped Lisinopril and added to her list of extensive allergy's. CT neck w/ extensive submandibular swelling. Given FFP x 3 units in addition to IV steroids, H1/H2 blockers and on empiric Abx cover. Will need ENT eval   Code status: Full  Line/tubes/ drains: Nasotracheal 6.5 ETT: 02/03    Critical Care time: 35 minutes assessing presenting problems of acute illness that poses high probability of life threatening deterioration or end organ damage/dysfunction.  Medical decision making including Initiating plan of care, reviewing data, reviewing radiology, discussing with multidisciplinary team, non inclusive of procedures    
72 y/o F w/ PMH of breast CA s/p b/l mastectomy, HTN (on ACEI), obesity presented to [facility] 2/3/22 c/o tongue and mandibular swelling requiring emergent intubation for respiratory distress and airway compromise now s/p extubation for angioedema and admitted to MICU and since downgraded medicine.      Angioedema 2/2 ACEI  - s/p emergent intubation/extubation x2 for airway compromise and shock  - Shock resolved and completed 5d course of Merrem   - Titrate off supplemental O2 as tolerated   - s/p Decacadron IV and benadryl/Pepcid   - NPO w/ NGT in place pending speech evaluation given pt failed bedside swallow eval       Acute onset RUE edema  - Erythema/edema occurred at site of IV, likely superficial thrombophlebitis  - f/u RUE US and maintain supportive care w/ arm elevation and warm compresses      Thrombocytopenia   - Etiology unclear and fluctuating  - Could be medication induced  - Will trend and if drops further will consider switching protonix to pepcid      Pupil Dilatation  - Neurologic exam intact and dilation improving  - CTH (-), EEG (-)   - Likely dilated 2/2 Epi administration into IJ vein       Chronic Low Back Pain  - Avoid NSAIDs for now  - Ofirmev 1g IV x1  - Lidocaine Patch q24      Essential HTN  - Off ACEI and added to Allergy List  - Maintain on Labetalol 100mg TID      DM II w/ hyperglycemia  - A1c 8.2  - On basal/bolus insulin regimen  - Titrate to maintain BG<180  - ISS and hypoglycemic protocol       Hx of BRCA  - s/p BL Mastectomy  - On Tamoxifen 20mg PO q24      Hx of Asthma  - Not in acute exacerbation at this time  - PRN bronchodilators       GERD  - Protonix       VTE ppx: Lovenox      Dispo: Pending clinical course. 
71F w/ PMHx HTN on ACE-I, asthma, B/L mastectomy presented on 02/03 w/ acute onset swelling of tongue and submandible, taken emergently from ED to OR for nasotracheal intubation    Impression/Plan:    Angioedema - likely 2/2 ACE-I use; s/p FFP x3U  S/p nasotracheal intubation in OR on 2/3/22  - ACE-I added to allergy list  - Can no longer use ACE-inhibitors  - Benadryl + Pepcid ATC  - Decadron 6mg IV Q6H  - Would perform cuff leak test in AM  - When ready for extubation, would extubate with ACS present and preferably in the OR if possible  - Daily SAT/SBT  - On Fentanyl/Versed for sedation - RASS goal -1  - ENT on board    HTN  - Needs alternate to ACE-I going forward    Distributive shock  - Now off Levophed  - Goal MAP >65  - Steroids as above  - Is on empiric Meropenem (2/3- ), would complete 5-7 day course    F/E/N/PPx/Lines  - LR 75cc/hr  - Monitor electrolytes and replete PRN  - NPO  - VTE PPx Lovenox 40 daily  - GI PPx - Pepcid  - PIV    Ethics/Dispo  - Full code  - C/w care in ICU        Gunnar Pal M.D.  Pulmonary & Critical Care Medicine  [facility]  Pulmonary and Sleep Medicine at [place]  [address]  [place] [address]  T: [phone]  F: [phone]
71F w/ PMHx HTN on ACE-I, asthma, B/L mastectomy presented on 02/03 w/ acute onset swelling of tongue and submandible, taken emergently from ED to OR for nasotracheal intubation    Impression/Plan:    Angioedema - likely 2/2 ACE-I use; s/p FFP x3U  S/p nasotracheal intubation in OR on 2/3/22  - ACE-I added to allergy list  - Can no longer use ACE-inhibitors  - Benadryl + Pepcid held  - Decadron tapered to 2mg IV daily  - Cuff leak test prior to extubation attempts  - Daily SAT/SBT  - Propofol for sedation  - ENT on board    Unequal pupil size  - F/u CTH  - Was able to move all 4 extremities  - Serial neuro exams    HTN  - Needs alternate to ACE-I going forward    Distributive shock  - Off pressors  - Goal MAP >65  - Steroids as above  - S/p 5 days of Meropenem    Hyperglycemia  - Lantus 25U + Admelog 6U Q6H + ISS  - Goal -180    F/E/N/PPx/Lines  - LR 75cc/hr  - Monitor electrolytes and replete PRN  - NPO  - VTE PPx Lovenox 40 daily  - GI PPx - Pepcid  - PIV    Ethics/Dispo  - Full code  - C/w care in ICU        Gunnar Pal M.D.  Pulmonary & Critical Care Medicine  [facility]  Pulmonary and Sleep Medicine at [place]  [address]  [place], [place] [address]  T: [phone]  F: [phone]

## 2022-02-11 NOTE — PROGRESS NOTE ADULT - PROVIDER SPECIALTY LIST ADULT
Critical Care
ENT
MICU
Hospitalist
Critical Care
Hospitalist
Internal Medicine
MICU

## 2022-02-11 NOTE — DISCHARGE NOTE PROVIDER - ATTENDING DISCHARGE PHYSICAL EXAMINATION:
Vital Signs Last 24 Hrs  T(C): 36.6 (11 Feb 2022 10:32), Max: 37.3 (10 Feb 2022 18:05)  T(F): 97.8 (11 Feb 2022 10:32), Max: 99.1 (10 Feb 2022 18:05)  HR: 65 (11 Feb 2022 10:32) (65 - 73)  BP: 166/85 (11 Feb 2022 10:32) (153/81 - 187/77)  BP(mean): --  RR: 18 (11 Feb 2022 10:32) (18 - 20)  SpO2: 96% (11 Feb 2022 10:32) (90% - 96%)    GENERAL: pt examined bedside, laying comfortably in bed in NAD  HEENT: NC/AT, moist oral mucosa, clear conjunctiva, sclera nonicteric, +NGT  RESPIRATORY: Normal respiratory effort; CTA b/l, no wheezing, rhonchi, rales  CARDIOVASCULAR: RRR, normal S1 and S2, no murmur/rub/gallop  ABDOMEN: soft, NT/ND, normoactive bowel sounds, no rebound/guarding  EXTREMITIES: No cynaosis, no clubbing, RUE edema, Peripheral pulses are 2+ bilaterally  PSYCH: affect appropriate and cooperative  NEUROLOGY: A+O to person, place, and time, no focal neurologic deficits appreciated   SKIN: RUE erythema

## 2022-02-11 NOTE — DISCHARGE NOTE PROVIDER - NSDCCPCAREPLAN_GEN_ALL_CORE_FT
PRINCIPAL DISCHARGE DIAGNOSIS  Diagnosis: Angioedema due to angiotensin converting enzyme inhibitor (ACE-I)  Assessment and Plan of Treatment: - s/p ICU admission w/ intubation now extubated   - Speech evaluation cleared to start diet easy to chew and moderatly thickened liquids  - Avoid ACEI's and ARB's in the future as those medications can precipitate angioedema again   - BP medications changed to labetolol

## 2022-02-11 NOTE — PROGRESS NOTE ADULT - REASON FOR ADMISSION
Angioedema

## 2022-02-11 NOTE — DISCHARGE NOTE PROVIDER - HOSPITAL COURSE
70 y/o F w/ PMH of breast CA s/p b/l mastectomy, HTN (on ACEI), obesity presented to [facility] 2/3/22 c/o tongue and mandibular swelling requiring emergent intubation for respiratory distress and airway compromise now s/p extubation for angioedema from ACEI use and admitted to MICU.  Hospital course also compolicated by shock requiring pressor support.  Pt was extubated titrated off pressor support and downgraded to midicine.  Pt was given Decacadron IV and benadryl/Pepcid during MICU course.  Pt was titrated off supplemental O2 and passed speech evaluation.  Diet resumed and NGT removed.  Pt noted to have acute onset RUE edema at site of IV.  IV was removed and RUE US ordered which showed rt cephalic vein superficial thrombophlebitis.  RUE improved w/ supportive care including warm compresses and arm elevation.      Thrombocytopenia   - Etiology unclear and has been fluctuating  - AM labs pending to reassess  - Could be medication induced and if drops further consider switching protonix to pepcid       Pupil Dilatation  - Neurologic exam intact and dilation improving  - CTH (-), EEG (-)   - Likely dilated 2/2 Epi administration into IJ vein       Chronic Low Back Pain  - Avoid NSAIDs for now  - Ofirmev 1g IV x1  - Lidocaine Patch q24      Essential HTN  - Off ACEI and added to Allergy List  - Maintain on Labetalol 100mg TID      DM II w/ hyperglycemia  - A1c 8.2  - On basal/bolus insulin regimen  - Titrate to maintain BG<180  - ISS and hypoglycemic protocol       Hx of BRCA  - s/p BL Mastectomy  - On Tamoxifen 20mg PO q24      Hx of Asthma  - Not in acute exacerbation at this time  - PRN bronchodilators       GERD  - Protonix       VTE ppx: Lovenox      Dispo: PT eval noted and recommending [facility].      72 y/o F w/ PMH of breast CA s/p b/l mastectomy, HTN (on ACEI), obesity presented to [facility] 2/3/22 c/o tongue and mandibular swelling requiring emergent intubation for respiratory distress and airway compromise now s/p extubation for angioedema from ACEI use and admitted to MICU.  Hospital course also compolicated by shock requiring pressor support.  Pt was extubated titrated off pressor support and downgraded to midicine.  Pt was given Decacadron IV and benadryl/Pepcid during MICU course.  Pt was titrated off supplemental O2 and passed speech evaluation.  Diet resumed and NGT removed.  Pt noted to have acute onset RUE edema at site of IV.  IV was removed and RUE US ordered which showed rt cephalic vein superficial thrombophlebitis.  RUE improved w/ supportive care including warm compresses and arm elevation.  PT evaluated pt and recommending [facility].  Pt now medically stable for d/c to [facility].

## 2022-02-16 LAB — GLUCOSE BLDC GLUCOMTR-MCNC: 199 MG/DL — HIGH (ref 70–99)

## 2022-04-11 ENCOUNTER — OUTPATIENT (OUTPATIENT)
Dept: OUTPATIENT SERVICES | Facility: HOSPITAL | Age: 72
LOS: 1 days | Discharge: HOME | End: 2022-04-11

## 2022-04-11 DIAGNOSIS — C50.919 MALIGNANT NEOPLASM OF UNSPECIFIED SITE OF UNSPECIFIED FEMALE BREAST: ICD-10-CM

## 2022-04-12 PROBLEM — E11.9 TYPE 2 DIABETES MELLITUS WITHOUT COMPLICATIONS: Chronic | Status: ACTIVE | Noted: 2022-02-03

## 2022-04-12 PROBLEM — J45.909 UNSPECIFIED ASTHMA, UNCOMPLICATED: Chronic | Status: ACTIVE | Noted: 2022-02-03

## 2022-04-13 ENCOUNTER — RESULT REVIEW (OUTPATIENT)
Age: 72
End: 2022-04-13

## 2022-04-13 ENCOUNTER — APPOINTMENT (OUTPATIENT)
Dept: HEMATOLOGY ONCOLOGY | Facility: CLINIC | Age: 72
End: 2022-04-13
Payer: MEDICARE

## 2022-04-13 VITALS
BODY MASS INDEX: 33.97 KG/M2 | SYSTOLIC BLOOD PRESSURE: 165 MMHG | WEIGHT: 199 LBS | HEART RATE: 89 BPM | OXYGEN SATURATION: 97 % | DIASTOLIC BLOOD PRESSURE: 87 MMHG | HEIGHT: 64 IN

## 2022-04-13 LAB
BASOPHILS # BLD AUTO: 0.1 K/UL — SIGNIFICANT CHANGE UP (ref 0–0.2)
BASOPHILS NFR BLD AUTO: 1.1 % — SIGNIFICANT CHANGE UP (ref 0–2)
EOSINOPHIL # BLD AUTO: 0.1 K/UL — SIGNIFICANT CHANGE UP (ref 0–0.5)
EOSINOPHIL NFR BLD AUTO: 1.1 % — SIGNIFICANT CHANGE UP (ref 0–6)
HCT VFR BLD CALC: 46.4 % — HIGH (ref 34.5–45)
HGB BLD-MCNC: 14.3 G/DL — SIGNIFICANT CHANGE UP (ref 11.5–15.5)
LYMPHOCYTES # BLD AUTO: 2.4 K/UL — SIGNIFICANT CHANGE UP (ref 1–3.3)
LYMPHOCYTES # BLD AUTO: 27.7 % — SIGNIFICANT CHANGE UP (ref 13–44)
MCHC RBC-ENTMCNC: 30.7 G/DL — LOW (ref 32–36)
MCHC RBC-ENTMCNC: 31.6 PG — SIGNIFICANT CHANGE UP (ref 27–34)
MCV RBC AUTO: 102.9 FL — HIGH (ref 80–100)
MONOCYTES # BLD AUTO: 0.6 K/UL — SIGNIFICANT CHANGE UP (ref 0–0.9)
MONOCYTES NFR BLD AUTO: 6.5 % — SIGNIFICANT CHANGE UP (ref 2–14)
NEUTROPHILS # BLD AUTO: 5.5 K/UL — SIGNIFICANT CHANGE UP (ref 1.8–7.4)
NEUTROPHILS NFR BLD AUTO: 63.5 % — SIGNIFICANT CHANGE UP (ref 43–77)
PLATELET # BLD AUTO: 177 K/UL — SIGNIFICANT CHANGE UP (ref 150–400)
RBC # BLD: 4.51 M/UL — SIGNIFICANT CHANGE UP (ref 3.8–5.2)
RBC # FLD: 13.3 % — SIGNIFICANT CHANGE UP (ref 10.3–14.5)
WBC # BLD: 8.7 K/UL — SIGNIFICANT CHANGE UP (ref 3.8–10.5)
WBC # FLD AUTO: 8.7 K/UL — SIGNIFICANT CHANGE UP (ref 3.8–10.5)

## 2022-04-13 PROCEDURE — 99213 OFFICE O/P EST LOW 20 MIN: CPT

## 2022-04-14 LAB
ALBUMIN SERPL ELPH-MCNC: 4.2 G/DL
ALP BLD-CCNC: 90 U/L
ALT SERPL-CCNC: 39 U/L
ANION GAP SERPL CALC-SCNC: 15 MMOL/L
AST SERPL-CCNC: 48 U/L
BILIRUB SERPL-MCNC: 0.4 MG/DL
BUN SERPL-MCNC: 11 MG/DL
CALCIUM SERPL-MCNC: 9.5 MG/DL
CHLORIDE SERPL-SCNC: 104 MMOL/L
CO2 SERPL-SCNC: 25 MMOL/L
CREAT SERPL-MCNC: 0.65 MG/DL
EGFR: 95 ML/MIN/1.73M2
GLUCOSE SERPL-MCNC: 195 MG/DL
POTASSIUM SERPL-SCNC: 4.5 MMOL/L
PROT SERPL-MCNC: 6.8 G/DL
SODIUM SERPL-SCNC: 145 MMOL/L

## 2022-04-14 NOTE — PHYSICAL EXAM
[Normal] : affect appropriate [de-identified] : Status post bilateral mastectomy with no evidence of recurrence

## 2022-04-14 NOTE — REVIEW OF SYSTEMS
[Negative] : Allergic/Immunologic [FreeTextEntry2] : negative except as indicated in interval history\par  [FreeTextEntry5] : angioedema resolved

## 2022-04-14 NOTE — ADDENDUM
[FreeTextEntry1] : Documented by Cori Cisse acting as scribe for Dr. Hi on 04/13/2022 \par \par All Medical record entries made by the Scribe were at my, Dr. Hi's, direction and personally dictated by me on 04/13/2022 . I have reviewed the chart and agree that the record accurately reflects my personal performance of the history, physical exam, assessment and plan. I have also personally directed, reviewed, and agreed with the discharge instructions.\par

## 2022-04-14 NOTE — ASSESSMENT
[FreeTextEntry1] : \par Stage 1 ER+, invasive ductal breast cancer, S/P bilateral mastectomy, tolerating adjuvant tamoxifen without difficulty.\par \par f/u in 1 year

## 2022-04-14 NOTE — HISTORY OF PRESENT ILLNESS
[de-identified] : \par Ms. LOPEZ is a 72 year y/o female followed for right breast IDC, 0.55 cm, ER+/TN+, Her2-. She is s/p bilateral mastectomy with bilateral axillary sentinel lymph node biopsies on 9/19/19 with Dr. Raymond Branham. She has hx of asthma, osteoporosis, OA, DM, and HLD. \par \par [facility] 2/3/22-2/11/22 \par Admitted to [facility] 2/3/22 c/o tongue and mandibular swelling requiring emergent intubation for respiratory distress and airway compromise now s/p extubation for angioedema from ACEI use and admitted to MICU.  Hospital course also compolicated by shock requiring pressor support.  Pt was extubated titrated off pressor support and downgraded to midicine.  Pt was given Decacadron IV and benadryl/Pepcid during MICU course.  Pt was titrated off supplemental O2 and passed speech evaluation.  Diet resumed and NGT removed.  Pt noted to have acute onset RUE edema at site of IV.  IV was removed and RUE US ordered which showed rt cephalic vein superficial thrombophlebitis.  RUE improved w/ supportive care including warm compresses and arm elevation. \par \par \par Surgical Pathology 9/19/19:\par -All left and right axillary sentinel lymph nodes are benign. \par D. Left breast, mastectomy:\par -Fibrocystic changes including florid intraductal hyperplasia. \par -Nipple without significant pathology diagnosis. \par H. Right breast stitch marks tail, mastectomy:\par -biopsy site changes no residual tumor seen.\par -Fibrocystic changes including florid intraductal hyperplasia. \par -Atypical ductal hyperplasia.\par -Nipple without significant pathology diagnosis. \par \par Biopsy Pathology 9/13/19:\par Breast, right, 2:00,needle core biopsy:\par -IDC, grade 2, 0.55 cm \par -ER positive, >90%\par -TN positive, >90%\par -[id number] negative, equivocal 2+. \par  [de-identified] : Doing well on adjuvant tamoxifen, with no side effects.\par \par Patient doing well\par S/p recent admission for Angio edema 2/2 from ace inhibitor usage\par Switched medications from  lisinopril to metoprolol \par

## 2022-12-16 NOTE — ED PROVIDER NOTE - NS ED MD TWO NIGHTS YN
Rapid Assessment Note    History:   Ashutosh Moraes is a 57 year old male who presents with kidney pain. The patient reports developing right-sided kidney pain at around 0730 this morning. He has been waiting here in the ED since 1130 today, and has vomited twice during that time. He also endorses chills, but denies any fevers. He recounts being sick about a week ago. Took two tylenol this morning.     Exam:   General:  Alert, interactive  Cardiovascular:  Well perfused  Lungs:  No respiratory distress, no accessory muscle use  Neuro:  Moving all 4 extremities  Skin:  Warm, dry  Psych:  Normal affect      Plan of Care:   Obtain blood work CT scan and UA. Will treat with toradol, zofran and fluids. I evaluated the patient and developed an initial plan of care. I discussed this plan and explained that I, or one of my partners, would be returning to complete the evaluation.     I, Star Bobby, am serving as a scribe to document services personally performed by Masoud Macias MD, based on my observations and the provider's statements to me.    12/16/2022  [facility] [facility]    Portions of this medical record were completed by a scribe. UPON MY REVIEW AND AUTHENTICATION BY ELECTRONIC SIGNATURE, this confirms (a) I performed the applicable clinical services, and (b) the record is accurate.        Masoud Macias MD  12/16/22 4791     Yes

## 2023-02-20 ENCOUNTER — OFFICE (OUTPATIENT)
Dept: URBAN - METROPOLITAN AREA CLINIC 113 | Facility: CLINIC | Age: 73
Setting detail: OPHTHALMOLOGY
End: 2023-02-20
Payer: MEDICARE

## 2023-02-20 DIAGNOSIS — E11.3293: ICD-10-CM

## 2023-02-20 DIAGNOSIS — D31.31: ICD-10-CM

## 2023-02-20 DIAGNOSIS — H40.013: ICD-10-CM

## 2023-02-20 DIAGNOSIS — H25.13: ICD-10-CM

## 2023-02-20 PROCEDURE — 92004 COMPRE OPH EXAM NEW PT 1/>: CPT | Performed by: STUDENT IN AN ORGANIZED HEALTH CARE EDUCATION/TRAINING PROGRAM

## 2023-02-20 PROCEDURE — 92250 FUNDUS PHOTOGRAPHY W/I&R: CPT | Performed by: STUDENT IN AN ORGANIZED HEALTH CARE EDUCATION/TRAINING PROGRAM

## 2023-02-20 ASSESSMENT — REFRACTION_CURRENTRX
OD_SPHERE: +1.75
OS_OVR_VA: 20/
OS_CYLINDER: SPH
OS_ADD: +2.50
OD_OVR_VA: 20/
OD_AXIS: 180
OS_AXIS: 180
OD_CYLINDER: SPH
OS_SPHERE: +2.25
OD_ADD: +2.50

## 2023-02-20 ASSESSMENT — AXIALLENGTH_DERIVED
OD_AL: 22.5404
OS_AL: 22.4935

## 2023-02-20 ASSESSMENT — SPHEQUIV_DERIVED
OS_SPHEQUIV: 2.25
OD_SPHEQUIV: 2

## 2023-02-20 ASSESSMENT — REFRACTION_AUTOREFRACTION
OS_CYLINDER: -1.00
OS_AXIS: 096
OS_SPHERE: +2.75
OD_SPHERE: +2.50
OD_CYLINDER: -1.00
OD_AXIS: 112

## 2023-02-20 ASSESSMENT — KERATOMETRY
OD_AXISANGLE_DEGREES: 021
OS_AXISANGLE_DEGREES: 020
OS_K1POWER_DIOPTERS: 44.00
OD_K1POWER_DIOPTERS: 44.00
OD_K2POWER_DIOPTERS: 44.75
OS_K2POWER_DIOPTERS: 44.50

## 2023-02-20 ASSESSMENT — TONOMETRY
OD_IOP_MMHG: 18
OS_IOP_MMHG: 16

## 2023-02-20 ASSESSMENT — CONFRONTATIONAL VISUAL FIELD TEST (CVF)
OS_FINDINGS: FULL
OD_FINDINGS: FULL

## 2023-02-20 ASSESSMENT — VISUAL ACUITY
OS_BCVA: 20/40
OD_BCVA: 20/25-1

## 2023-04-11 ENCOUNTER — OFFICE (OUTPATIENT)
Dept: URBAN - METROPOLITAN AREA CLINIC 113 | Facility: CLINIC | Age: 73
Setting detail: OPHTHALMOLOGY
End: 2023-04-11
Payer: MEDICARE

## 2023-04-11 DIAGNOSIS — H40.013: ICD-10-CM

## 2023-04-11 DIAGNOSIS — D31.31: ICD-10-CM

## 2023-04-11 DIAGNOSIS — H25.13: ICD-10-CM

## 2023-04-11 DIAGNOSIS — E11.3293: ICD-10-CM

## 2023-04-11 PROCEDURE — 92083 EXTENDED VISUAL FIELD XM: CPT | Performed by: STUDENT IN AN ORGANIZED HEALTH CARE EDUCATION/TRAINING PROGRAM

## 2023-04-11 PROCEDURE — 92133 CPTRZD OPH DX IMG PST SGM ON: CPT | Performed by: STUDENT IN AN ORGANIZED HEALTH CARE EDUCATION/TRAINING PROGRAM

## 2023-04-11 PROCEDURE — 76514 ECHO EXAM OF EYE THICKNESS: CPT | Performed by: STUDENT IN AN ORGANIZED HEALTH CARE EDUCATION/TRAINING PROGRAM

## 2023-04-11 PROCEDURE — 92020 GONIOSCOPY: CPT | Performed by: STUDENT IN AN ORGANIZED HEALTH CARE EDUCATION/TRAINING PROGRAM

## 2023-04-11 PROCEDURE — 99213 OFFICE O/P EST LOW 20 MIN: CPT | Performed by: STUDENT IN AN ORGANIZED HEALTH CARE EDUCATION/TRAINING PROGRAM

## 2023-04-11 ASSESSMENT — PACHYMETRY
OS_CT_CORRECTION: -6
OD_CT_CORRECTION: -4
OS_CT_UM: 620
OD_CT_UM: 606

## 2023-04-11 ASSESSMENT — SPHEQUIV_DERIVED
OD_SPHEQUIV: 1.75
OS_SPHEQUIV: 2.125

## 2023-04-11 ASSESSMENT — TONOMETRY
OD_IOP_MMHG: 21
OS_IOP_MMHG: 18

## 2023-04-11 ASSESSMENT — REFRACTION_AUTOREFRACTION
OS_SPHERE: +2.50
OS_CYLINDER: -0.75
OS_AXIS: 098
OD_SPHERE: +2.25
OD_CYLINDER: -1.00
OD_AXIS: 104

## 2023-04-11 ASSESSMENT — REFRACTION_CURRENTRX
OS_AXIS: 180
OS_SPHERE: +2.25
OS_ADD: +2.50
OS_CYLINDER: SPH
OD_ADD: +2.50
OD_SPHERE: +1.75
OS_OVR_VA: 20/
OD_AXIS: 180
OD_CYLINDER: SPH
OD_OVR_VA: 20/

## 2023-04-11 ASSESSMENT — CONFRONTATIONAL VISUAL FIELD TEST (CVF)
OS_FINDINGS: FULL
OD_FINDINGS: FULL

## 2023-04-11 ASSESSMENT — KERATOMETRY
OD_K1POWER_DIOPTERS: 43.75
OD_AXISANGLE_DEGREES: 025
OS_K2POWER_DIOPTERS: 44.50
OD_K2POWER_DIOPTERS: 45.00
OS_K1POWER_DIOPTERS: 44.00
OS_AXISANGLE_DEGREES: 032

## 2023-04-11 ASSESSMENT — AXIALLENGTH_DERIVED
OS_AL: 22.5378
OD_AL: 22.6296

## 2023-04-11 ASSESSMENT — VISUAL ACUITY
OD_BCVA: 20/25-1
OS_BCVA: 20/30-1

## 2023-04-14 ENCOUNTER — OUTPATIENT (OUTPATIENT)
Dept: OUTPATIENT SERVICES | Facility: HOSPITAL | Age: 73
LOS: 1 days | Discharge: HOME | End: 2023-04-14

## 2023-04-14 DIAGNOSIS — C50.919 MALIGNANT NEOPLASM OF UNSPECIFIED SITE OF UNSPECIFIED FEMALE BREAST: ICD-10-CM

## 2023-04-19 ENCOUNTER — APPOINTMENT (OUTPATIENT)
Dept: HEMATOLOGY ONCOLOGY | Facility: CLINIC | Age: 73
End: 2023-04-19
Payer: MEDICARE

## 2023-04-19 ENCOUNTER — RESULT REVIEW (OUTPATIENT)
Age: 73
End: 2023-04-19

## 2023-04-19 VITALS
DIASTOLIC BLOOD PRESSURE: 86 MMHG | HEIGHT: 64 IN | TEMPERATURE: 98.2 F | BODY MASS INDEX: 32.27 KG/M2 | SYSTOLIC BLOOD PRESSURE: 134 MMHG | HEART RATE: 78 BPM | WEIGHT: 189 LBS | OXYGEN SATURATION: 96 %

## 2023-04-19 DIAGNOSIS — C50.919 MALIGNANT NEOPLASM OF UNSPECIFIED SITE OF UNSPECIFIED FEMALE BREAST: ICD-10-CM

## 2023-04-19 LAB
ALBUMIN SERPL ELPH-MCNC: 4.4 G/DL
ALP BLD-CCNC: 80 U/L
ALT SERPL-CCNC: 71 U/L
ANION GAP SERPL CALC-SCNC: 14 MMOL/L
AST SERPL-CCNC: 62 U/L
BASOPHILS # BLD AUTO: 0.1 K/UL — SIGNIFICANT CHANGE UP (ref 0–0.2)
BASOPHILS NFR BLD AUTO: 1.2 % — SIGNIFICANT CHANGE UP (ref 0–2)
BILIRUB SERPL-MCNC: 0.4 MG/DL
BUN SERPL-MCNC: 15 MG/DL
CALCIUM SERPL-MCNC: 9.6 MG/DL
CHLORIDE SERPL-SCNC: 104 MMOL/L
CO2 SERPL-SCNC: 24 MMOL/L
CREAT SERPL-MCNC: 0.65 MG/DL
EGFR: 94 ML/MIN/1.73M2
EOSINOPHIL # BLD AUTO: 0.1 K/UL — SIGNIFICANT CHANGE UP (ref 0–0.5)
EOSINOPHIL NFR BLD AUTO: 1 % — SIGNIFICANT CHANGE UP (ref 0–6)
GLUCOSE SERPL-MCNC: 145 MG/DL
HCT VFR BLD CALC: 45.1 % — HIGH (ref 34.5–45)
HGB BLD-MCNC: 15 G/DL — SIGNIFICANT CHANGE UP (ref 11.5–15.5)
LYMPHOCYTES # BLD AUTO: 2 K/UL — SIGNIFICANT CHANGE UP (ref 1–3.3)
LYMPHOCYTES # BLD AUTO: 23.6 % — SIGNIFICANT CHANGE UP (ref 13–44)
MCHC RBC-ENTMCNC: 32.9 PG — SIGNIFICANT CHANGE UP (ref 27–34)
MCHC RBC-ENTMCNC: 33.2 G/DL — SIGNIFICANT CHANGE UP (ref 32–36)
MCV RBC AUTO: 99 FL — SIGNIFICANT CHANGE UP (ref 80–100)
MONOCYTES # BLD AUTO: 0.7 K/UL — SIGNIFICANT CHANGE UP (ref 0–0.9)
MONOCYTES NFR BLD AUTO: 7.9 % — SIGNIFICANT CHANGE UP (ref 2–14)
NEUTROPHILS # BLD AUTO: 5.6 K/UL — SIGNIFICANT CHANGE UP (ref 1.8–7.4)
NEUTROPHILS NFR BLD AUTO: 66.3 % — SIGNIFICANT CHANGE UP (ref 43–77)
PLATELET # BLD AUTO: 147 K/UL — LOW (ref 150–400)
POTASSIUM SERPL-SCNC: 4.5 MMOL/L
PROT SERPL-MCNC: 6.7 G/DL
RBC # BLD: 4.55 M/UL — SIGNIFICANT CHANGE UP (ref 3.8–5.2)
RBC # FLD: 13.3 % — SIGNIFICANT CHANGE UP (ref 10.3–14.5)
SODIUM SERPL-SCNC: 142 MMOL/L
WBC # BLD: 8.4 K/UL — SIGNIFICANT CHANGE UP (ref 3.8–10.5)
WBC # FLD AUTO: 8.4 K/UL — SIGNIFICANT CHANGE UP (ref 3.8–10.5)

## 2023-04-19 PROCEDURE — 99213 OFFICE O/P EST LOW 20 MIN: CPT

## 2023-04-19 NOTE — ASSESSMENT
[FreeTextEntry1] : \par Stage 1 ER+, invasive ductal breast cancer, S/P bilateral mastectomy, tolerating adjuvant tamoxifen without difficulty. Planned 5 year completion of tamoxifen 10/2024.\par \par \par F/u in October 2024

## 2023-04-19 NOTE — HISTORY OF PRESENT ILLNESS
[de-identified] : \par Ms. 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 She is s/p bilateral mastectomy with bilateral axillary sentinel lymph node biopsies on 9/19/19 with Dr. Raymond Branham. She has hx of asthma, osteoporosis, OA, DM, and HLD. \par \par [facility] 2/3/22-2/11/22 \par Admitted to [facility] 2/3/22 c/o tongue and mandibular swelling requiring emergent intubation for respiratory distress and airway compromise now s/p extubation for angioedema from ACEI use and admitted to MICU.  Hospital course also compolicated by shock requiring pressor support.  Pt was extubated titrated off pressor support and downgraded to midicine.  Pt was given Decacadron IV and benadryl/Pepcid during MICU course.  Pt was titrated off supplemental O2 and passed speech evaluation.  Diet resumed and NGT removed.  Pt noted to have acute onset RUE edema at site of IV.  IV was removed and RUE US ordered which showed rt cephalic vein superficial thrombophlebitis.  RUE improved w/ supportive care including warm compresses and arm elevation. \par \par \par Surgical Pathology 9/19/19:\par -All left and right axillary sentinel lymph nodes are benign. \par D. Left breast, mastectomy:\par -Fibrocystic changes including florid intraductal hyperplasia. \par -Nipple without significant pathology diagnosis. \par H. Right breast stitch marks tail, mastectomy:\par -biopsy site changes no residual tumor seen.\par -Fibrocystic changes including florid intraductal hyperplasia. \par -Atypical ductal hyperplasia.\par -Nipple without significant pathology diagnosis. \par \par Biopsy Pathology 9/13/19:\par Breast, right, 2:00,needle core biopsy:\par -IDC, grade 2, 0.55 cm \par -ER positive, >90%\par -CO positive, >90%\par -[id number] negative, equivocal 2+. \par  [de-identified] : Doing well on adjuvant tamoxifen, with no side effects.\par \par

## 2023-04-19 NOTE — ADDENDUM
[FreeTextEntry1] : Documented by Cori Cisse acting as scribe for Dr. Hi on 04/19/2023 \par \par All Medical record entries made by the Scribe were at my, Dr. Hi's, direction and personally dictated by me on 04/19/2023 . I have reviewed the chart and agree that the record accurately reflects my personal performance of the history, physical exam, assessment and plan. I have also personally directed, reviewed, and agreed with the discharge instructions.\par

## 2023-04-19 NOTE — REVIEW OF SYSTEMS
[Negative] : Allergic/Immunologic [FreeTextEntry2] : negative except as indicated in interval history\par

## 2023-05-22 ENCOUNTER — RX RENEWAL (OUTPATIENT)
Age: 73
End: 2023-05-22

## 2023-10-09 ENCOUNTER — OFFICE (OUTPATIENT)
Dept: URBAN - METROPOLITAN AREA CLINIC 113 | Facility: CLINIC | Age: 73
Setting detail: OPHTHALMOLOGY
End: 2023-10-09
Payer: MEDICARE

## 2023-10-09 DIAGNOSIS — H40.013: ICD-10-CM

## 2023-10-09 DIAGNOSIS — E11.3293: ICD-10-CM

## 2023-10-09 PROCEDURE — 99213 OFFICE O/P EST LOW 20 MIN: CPT | Performed by: STUDENT IN AN ORGANIZED HEALTH CARE EDUCATION/TRAINING PROGRAM

## 2023-10-09 PROCEDURE — 92134 CPTRZ OPH DX IMG PST SGM RTA: CPT | Performed by: STUDENT IN AN ORGANIZED HEALTH CARE EDUCATION/TRAINING PROGRAM

## 2023-10-09 ASSESSMENT — REFRACTION_AUTOREFRACTION
OS_AXIS: 093
OS_CYLINDER: -1.00
OD_CYLINDER: -1.50
OD_SPHERE: +2.50
OD_AXIS: 107
OS_SPHERE: +2.50

## 2023-10-09 ASSESSMENT — REFRACTION_CURRENTRX
OS_OVR_VA: 20/
OD_CYLINDER: SPH
OS_CYLINDER: SPH
OS_AXIS: 180
OS_ADD: +2.50
OD_OVR_VA: 20/
OD_AXIS: 180
OS_SPHERE: +2.25
OD_ADD: +2.50
OD_SPHERE: +1.75

## 2023-10-09 ASSESSMENT — AXIALLENGTH_DERIVED
OS_AL: 22.7519
OD_AL: 22.7144

## 2023-10-09 ASSESSMENT — TONOMETRY
OD_IOP_MMHG: 17
OS_IOP_MMHG: 14

## 2023-10-09 ASSESSMENT — CONFRONTATIONAL VISUAL FIELD TEST (CVF)
OS_FINDINGS: FULL
OD_FINDINGS: FULL

## 2023-10-09 ASSESSMENT — PACHYMETRY
OS_CT_CORRECTION: -6
OD_CT_UM: 606
OS_CT_UM: 620
OD_CT_CORRECTION: -4

## 2023-10-09 ASSESSMENT — KERATOMETRY
OD_K2POWER_DIOPTERS: 44.75
OD_AXISANGLE_DEGREES: 020
OD_K1POWER_DIOPTERS: 43.50
OS_AXISANGLE_DEGREES: 017
OS_K1POWER_DIOPTERS: 43.50
OS_K2POWER_DIOPTERS: 44.00

## 2023-10-09 ASSESSMENT — SPHEQUIV_DERIVED
OD_SPHEQUIV: 1.75
OS_SPHEQUIV: 2

## 2023-10-09 ASSESSMENT — VISUAL ACUITY
OS_BCVA: 20/40
OD_BCVA: 20/50+

## 2023-10-25 ENCOUNTER — APPOINTMENT (OUTPATIENT)
Dept: HEMATOLOGY ONCOLOGY | Facility: CLINIC | Age: 73
End: 2023-10-25

## 2023-11-29 NOTE — PHYSICAL THERAPY INITIAL EVALUATION ADULT - SOCIAL CONCERNS
History  Chief Complaint   Patient presents with    Altered Mental Status     C/o confusion that pt's family believes to be r/t new medications; also c/o bradycardia at home per family     Most of this patient's history is obtained from his daughter who is his care provider. This is a 72-year-old male who had a syncopal episode at home today. Patient's daughter reports to me that he was just discharged from [facility] about a week ago. His medications were adjusted which included an increase in metoprolol as well as adding amiodarone. He was also started on prednisone secondary to a diagnosis of what she reports to be bronchitis. She also reports to me that he was "dehydrated."    Apparently while ambulating into the living room today patient became ill and then passed out. She caught the patient to prevent injury. She then called her  and they brought the patient to emergency department for further evaluation. Patient reports that he does not recall that he was going to pass out. He denies any headache, chest pain, shortness of breath, no abdominal pain. No unilateral weakness. History provided by:  Patient, parent and caregiver  Syncope  Episode history:  Single  Most recent episode: Today  Progression:  Resolved  Chronicity:  New  Context: medication change, normal activity and standing up    Witnessed: yes    Relieved by:  Lying down  Associated symptoms: no anxiety, no chest pain, no confusion, no diaphoresis, no difficulty breathing, no dizziness, no fever, no nausea, no seizures, no vomiting and no weakness        Prior to Admission Medications   Prescriptions Last Dose Informant Patient Reported? Taking? Multiple Vitamin (MULTIVITAMIN) capsule  Self Yes No   Sig: Take 1 capsule by mouth daily   Ostomy Supplies (New Image Closed 2-3/4") Pouch MISC   No No   Sig: USE AS DIRECTED BY YOUR PHYSICIAN.    Ostomy Supplies (New Image Flat Barrier 70MM) WAFR   Yes No   albuterol (PROVENTIL HFA,VENTOLIN HFA) 90 mcg/act inhaler  Self No No   Sig: Inhale 2 puffs every 6 (six) hours as needed for wheezing   aspirin (ECOTRIN LOW STRENGTH) 81 mg EC tablet  Self Yes No   Sig: Take 81 mg by mouth daily   azelastine (ASTELIN) 0.1 % nasal spray   No No   Si spray into each nostril 2 (two) times a day Use in each nostril as directed   digoxin (LANOXIN) 0.125 mg tablet   No No   Sig: Take 1 tablet (125 mcg total) by mouth daily   ketorolac (ACULAR) 0.5 % ophthalmic solution   Yes No   losartan-hydrochlorothiazide (HYZAAR) 50-12.5 mg per tablet   No No   Sig: Take 1 tablet by mouth daily   metoprolol tartrate (LOPRESSOR) 25 mg tablet   No No   Sig: Take 1 tablet (25 mg total) by mouth every 12 (twelve) hours   montelukast (SINGULAIR) 10 mg tablet   No No   Sig: Take 1 tablet (10 mg total) by mouth daily at bedtime   triamcinolone (KENALOG) 0.1 % cream   No No   Sig: Apply topically 2 (two) times a day      Facility-Administered Medications: None       Past Medical History:   Diagnosis Date    Atrial fibrillation (HCC)     Colorectal cancer (HCC)     HL (hearing loss)     Hypertension     Pneumonia     Rectal carcinoma ([address]) 2019       Past Surgical History:   Procedure Laterality Date    COLECTOMY      COLOSTOMY         Family History   Problem Relation Age of Onset    Heart disease Mother     Heart disease Father      I have reviewed and agree with the history as documented. E-Cigarette/Vaping    E-Cigarette Use Never User      E-Cigarette/Vaping Substances     Social History     Tobacco Use    Smoking status: Every Day     Packs/day: 0.50     Years: 40.00     Total pack years: 20.00     Types: Cigarettes    Smokeless tobacco: Never   Vaping Use    Vaping Use: Never used   Substance Use Topics    Alcohol use: Yes    Drug use: No       Review of Systems   Constitutional: Negative. Negative for diaphoresis and fever. HENT:  Positive for nosebleeds. Respiratory: Negative. Cardiovascular:  Positive for syncope. Negative for chest pain. Gastrointestinal: Negative. Negative for nausea and vomiting. Genitourinary: Negative. Neurological:  Negative for dizziness, seizures and weakness. Psychiatric/Behavioral:  Negative for confusion. All other systems reviewed and are negative. Physical Exam  Physical Exam  Vitals and nursing note reviewed. Constitutional:       General: He is not in acute distress. Appearance: He is normal weight. He is not ill-appearing or toxic-appearing. HENT:      Head: Normocephalic and atraumatic. Right Ear: External ear normal.      Left Ear: External ear normal.      Nose: Congestion present. Right Nostril: Epistaxis (Dried blood from right nare. Daughter reports that this occurred last evening.) present. Mouth/Throat:      Mouth: Mucous membranes are dry. Cardiovascular:      Rate and Rhythm: Bradycardia present. No extrasystoles are present. Pulmonary:      Effort: Pulmonary effort is normal. No respiratory distress. Breath sounds: No stridor. No wheezing, rhonchi or rales. Abdominal:      General: Abdomen is flat. There is no distension. Tenderness: There is no abdominal tenderness. There is no guarding. Musculoskeletal:         General: No signs of injury. Skin:     Coloration: Skin is not jaundiced or pale. Neurological:      General: No focal deficit present. Mental Status: He is alert. Mental status is at baseline. Psychiatric:         Mood and Affect: Mood normal.         Thought Content:  Thought content normal.         Judgment: Judgment normal.         Vital Signs  ED Triage Vitals [11/29/23 1341]   Temperature Pulse Respirations Blood Pressure SpO2   98.4 °F (36.9 °C) 58 18 155/67 100 %      Temp Source Heart Rate Source Patient Position - Orthostatic VS BP Location FiO2 (%)   Temporal Monitor Lying Left arm --      Pain Score       --           Vitals:    11/29/23 1445 11/29/23 1500 11/29/23 1503 11/29/23 1515   BP: 161/70 153/67  156/67   Pulse: 60 99 76 64   Patient Position - Orthostatic VS:  Lying           Visual Acuity      ED Medications  Medications   lactated ringers infusion (150 mL/hr Intravenous New Bag 11/29/23 1515)   sodium chloride 0.9 % bolus 500 mL (0 mL Intravenous Stopped 11/29/23 1442)       Diagnostic Studies  Results Reviewed       Procedure Component Value Units Date/Time    HS Troponin 0hr (reflex protocol) [[id number]]  (Normal) Collected: 11/29/23 1417    Lab Status: Final result Specimen: Blood from Arm, Right Updated: 11/29/23 1500     hs TnI 0hr 12 ng/L     HS Troponin I 2hr [[id number]]     Lab Status: No result Specimen: Blood     Digoxin level [[id number]]  (Normal) Collected: 11/29/23 1417    Lab Status: Final result Specimen: Blood from Arm, Right Updated: 11/29/23 1456     Digoxin Lvl 1.2 ng/mL     Comprehensive metabolic panel [[id number]]  (Abnormal) Collected: 11/29/23 1417    Lab Status: Final result Specimen: Blood from Arm, Right Updated: 11/29/23 1456     Sodium 137 mmol/L      Potassium 3.7 mmol/L      Chloride 104 mmol/L      CO2 26 mmol/L      ANION GAP 7 mmol/L      BUN 38 mg/dL      Creatinine 1.79 mg/dL      Glucose 105 mg/dL      Calcium 8.4 mg/dL      AST 19 U/L      ALT 20 U/L      Alkaline Phosphatase 46 U/L      Total Protein 6.5 g/dL      Albumin 3.5 g/dL      Total Bilirubin 0.54 mg/dL      eGFR 31 ml/min/1.73sq m     Narrative:      [place] guidelines for Chronic Kidney Disease (CKD):     Stage 1 with normal or high GFR (GFR > 90 mL/min/1.73 square meters)    Stage 2 Mild CKD (GFR = 60-89 mL/min/1.73 square meters)    Stage 3A Moderate CKD (GFR = 45-59 mL/min/1.73 square meters)    Stage 3B Moderate CKD (GFR = 30-44 mL/min/1.73 square meters)    Stage 4 Severe CKD (GFR = 15-29 mL/min/1.73 square meters)    Stage 5 End Stage CKD (GFR <15 mL/min/1.73 square meters)  Note: GFR calculation is accurate only with a steady state creatinine    CBC and differential [[id number]]  (Abnormal) Collected: 11/29/23 1417    Lab Status: Final result Specimen: Blood from Arm, Right Updated: 11/29/23 1429     WBC 13.86 Thousand/uL      RBC 3.38 Million/uL      Hemoglobin 11.1 g/dL      Hematocrit 34.2 %       fL      MCH 32.8 pg      MCHC 32.5 g/dL      RDW 13.7 %      MPV 10.5 fL      Platelets 097 Thousands/uL      nRBC 0 /100 WBCs      Neutrophils Relative 77 %      Immat GRANS % 1 %      Lymphocytes Relative 13 %      Monocytes Relative 9 %      Eosinophils Relative 0 %      Basophils Relative 0 %      Neutrophils Absolute 10.74 Thousands/µL      Immature Grans Absolute 0.11 Thousand/uL      Lymphocytes Absolute 1.74 Thousands/µL      Monocytes Absolute 1.20 Thousand/µL      Eosinophils Absolute 0.06 Thousand/µL      Basophils Absolute 0.01 Thousands/µL                    XR chest 1 view portable    (Results Pending)   CT chest without contrast    (Results Pending)              Procedures  ECG 12 Lead Documentation Only    Date/Time: 11/29/2023 2:40 PM    Performed by: Jerald Buerger, DO  Authorized by: Jerald Buerger, DO    ECG reviewed by me, the ED Provider: yes    Patient location:  ED  Previous ECG:     Previous ECG:  Unavailable  Interpretation:     Interpretation: normal    Rate:     ECG rate assessment: bradycardic    Rhythm:     Rhythm: sinus bradycardia    Ectopy:     Ectopy: none    QRS:     QRS axis:  Normal  Conduction:     Conduction: normal    ST segments:     ST segments:  Non-specific  T waves:     T waves: non-specific    Comments:      Sagging at the terminal portion of the lateral QRSs consistent with digoxin effect  qTC not prolonged. ED Course  ED Course as of 11/29/23 1529   Wed Nov 29, 2023   1441 No old EKG available at our location for comparison. Findings appear to be consistent with medication effect. Patient has follow-up with his primary care provider tomorrow.    1442 WBC(!): 13.86  Patient has been on prednisone. [address]: 1.2   1503 Last creatinine was 1.4. Renal function also slightly decreased. Patient has become further dehydrated. Patient also overly medicated. SBIRT 20yo+      Flowsheet Row Most Recent Value   Initial Alcohol Screen: US AUDIT-C     1. How often do you have a drink containing alcohol? 0 Filed at: 11/29/2023 1343   2. How many drinks containing alcohol do you have on a typical day you are drinking? 0 Filed at: 11/29/2023 1343   3b. FEMALE Any Age, or MALE 65+: How often do you have 4 or more drinks on one occassion? 0 Filed at: 11/29/2023 1343   Audit-C Score 0 Filed at: 11/29/2023 1343   SAYRA: How many times in the past year have you. .. Used an illegal drug or used a prescription medication for non-medical reasons? Never Filed at: 11/29/2023 1343                      Medical Decision Making  Patient presented to the emergency department and a MSE was performed. The patient was evaluated for complaint related to acute syncope. Patient is potentially at risk for, but not limited to, idiopathic cause, vasovagal, carotid sinus syndrome, volume depletion, diabetes, dysrhythmia, cardiac disease such as myocardial infarction, pulmonary embolism, side effect of medication, or TIA/stroke. Several of these diagnoses have been evaluated and ruled out by history and physical.  As needed, patient will be further evaluated with laboratory and imaging studies. Higher level diagnostics, such as CT imaging or ultrasound, may also be required. Please see work-up portion of the note for further evaluation of patient's risk. Socioeconomic factors were also considered as part of the decision-making process. Unless otherwise stated in the chart or patient is admitted as elsewhere documented, any previously prescribed medications will be maintained. Problems Addressed:   Adverse effect of drug, initial encounter: complicated acute illness or injury with systemic symptoms that poses a threat to life or bodily functions     Details: Family updated with regard to the patient's findings and need for admission. They are agreeable with same. DANICA (acute kidney injury) [facility]): chronic illness or injury with exacerbation, progression, or side effects of treatment  Dehydration: acute illness or injury  Syncope: acute illness or injury     Details: Likely related to medication reaction. Amount and/or Complexity of Data Reviewed  Independent Historian: caregiver     Details: History provided by daughter. External Data Reviewed: labs and notes. Details: [address] [facility] reviewed. Labs: ordered. Decision-making details documented in ED Course. Radiology: ordered. Decision-making details documented in ED Course. Details: Discussed with radiologist  Discussion of management or test interpretation with external provider(s): Care management with radiology and internal medicine    Risk  Prescription drug management. Decision regarding hospitalization. Risk Details: Patient presented to the emergency department and a MSE was performed. The patient was evaluated and diagnosed with acute to be likely related to a medication reaction with associated DANICA. [place] This is a new issue that will require additional planned work-up and treatment in a hospitalized setting. As may have been required as part of this evaluation, clinical laboratory test, radiology imaging and medical testing (I.e. EKG) were ordered as necessitated by the patient's presentation. I independently reviewed these studies, imaging and testing. This patient's case is considered to be a considerable risk secondary to the above listed disease process and poses a threat to the patient's well-being and baseline function. Further in-patient diagnostic testing and management, which may include the administration of parenteral medications, is required.                  Disposition  Final diagnoses:   Syncope   DANICA (acute kidney injury) ([address])   Dehydration   Adverse effect of drug, initial encounter     Time reflects when diagnosis was documented in both MDM as applicable and the Disposition within this note       Time User Action Codes Description Comment    11/29/2023  3:07 PM Nicolette King Add [R55] Syncope     11/29/2023  3:07 PM Randa Sanchez Add [N17.9] DANICA (acute kidney injury) ([address])     11/29/2023  3:07 PM Nicolette King Add [E86.0] Dehydration     11/29/2023  3:07 PM Randa Sanchez Add [T50.905A] Adverse effect of drug, initial encounter           ED Disposition       ED Disposition   Admit    Condition   Stable    Date/Time   Wed Nov 29, 2023 1507    Comment   . Follow-up Information    None         Patient's Medications   Discharge Prescriptions    No medications on file       No discharge procedures on file.     PDMP Review       None            ED Provider  Electronically Signed by             Mindy Sims DO  11/29/23 6133 None

## 2024-05-20 ENCOUNTER — RX RENEWAL (OUTPATIENT)
Age: 74
End: 2024-05-20

## 2024-05-20 RX ORDER — TAMOXIFEN CITRATE 20 MG/1
20 TABLET, FILM COATED ORAL
Qty: 90 | Refills: 3 | Status: ACTIVE | COMMUNITY
Start: 2019-11-01 | End: 1900-01-01

## 2024-07-15 ENCOUNTER — OFFICE (OUTPATIENT)
Dept: URBAN - METROPOLITAN AREA CLINIC 113 | Facility: CLINIC | Age: 74
Setting detail: OPHTHALMOLOGY
End: 2024-07-15
Payer: MEDICARE

## 2024-07-15 DIAGNOSIS — H25.13: ICD-10-CM

## 2024-07-15 DIAGNOSIS — H40.013: ICD-10-CM

## 2024-07-15 DIAGNOSIS — E11.3293: ICD-10-CM

## 2024-07-15 PROCEDURE — 92133 CPTRZD OPH DX IMG PST SGM ON: CPT | Performed by: STUDENT IN AN ORGANIZED HEALTH CARE EDUCATION/TRAINING PROGRAM

## 2024-07-15 PROCEDURE — 92083 EXTENDED VISUAL FIELD XM: CPT | Performed by: STUDENT IN AN ORGANIZED HEALTH CARE EDUCATION/TRAINING PROGRAM

## 2024-07-15 PROCEDURE — 99213 OFFICE O/P EST LOW 20 MIN: CPT | Performed by: STUDENT IN AN ORGANIZED HEALTH CARE EDUCATION/TRAINING PROGRAM

## 2024-07-30 ENCOUNTER — OFFICE (OUTPATIENT)
Dept: URBAN - METROPOLITAN AREA CLINIC 113 | Facility: CLINIC | Age: 74
Setting detail: OPHTHALMOLOGY
End: 2024-07-30
Payer: MEDICARE

## 2024-07-30 DIAGNOSIS — E11.3293: ICD-10-CM

## 2024-07-30 DIAGNOSIS — H25.12: ICD-10-CM

## 2024-07-30 DIAGNOSIS — D31.31: ICD-10-CM

## 2024-07-30 DIAGNOSIS — H25.13: ICD-10-CM

## 2024-07-30 DIAGNOSIS — H40.013: ICD-10-CM

## 2024-07-30 PROCEDURE — 92136 OPHTHALMIC BIOMETRY: CPT | Mod: 26,LT | Performed by: STUDENT IN AN ORGANIZED HEALTH CARE EDUCATION/TRAINING PROGRAM

## 2024-07-30 PROCEDURE — 92136 OPHTHALMIC BIOMETRY: CPT | Mod: TC | Performed by: STUDENT IN AN ORGANIZED HEALTH CARE EDUCATION/TRAINING PROGRAM

## 2024-07-30 PROCEDURE — 99213 OFFICE O/P EST LOW 20 MIN: CPT | Performed by: STUDENT IN AN ORGANIZED HEALTH CARE EDUCATION/TRAINING PROGRAM

## 2024-09-17 ENCOUNTER — AMBULATORY SURGERY CENTER (OUTPATIENT)
Dept: URBAN - METROPOLITAN AREA SURGERY 4 | Facility: SURGERY | Age: 74
Setting detail: OPHTHALMOLOGY
End: 2024-09-17
Payer: MEDICARE

## 2024-09-17 DIAGNOSIS — H25.12: ICD-10-CM

## 2024-09-17 DIAGNOSIS — H52.212: ICD-10-CM

## 2024-09-17 PROCEDURE — FEMTO FEMTOSECOND LASER: Mod: GY | Performed by: STUDENT IN AN ORGANIZED HEALTH CARE EDUCATION/TRAINING PROGRAM

## 2024-09-17 PROCEDURE — A9270 NON-COVERED ITEM OR SERVICE: HCPCS | Mod: GY | Performed by: STUDENT IN AN ORGANIZED HEALTH CARE EDUCATION/TRAINING PROGRAM

## 2024-09-17 PROCEDURE — 68841 INSJ RX ELUT IMPLT LAC CANAL: CPT | Mod: LT | Performed by: STUDENT IN AN ORGANIZED HEALTH CARE EDUCATION/TRAINING PROGRAM

## 2024-09-17 PROCEDURE — 66984 XCAPSL CTRC RMVL W/O ECP: CPT | Mod: LT | Performed by: STUDENT IN AN ORGANIZED HEALTH CARE EDUCATION/TRAINING PROGRAM

## 2024-09-18 ENCOUNTER — RX ONLY (RX ONLY)
Age: 74
End: 2024-09-18

## 2024-09-18 ENCOUNTER — OFFICE (OUTPATIENT)
Dept: URBAN - METROPOLITAN AREA CLINIC 113 | Facility: CLINIC | Age: 74
Setting detail: OPHTHALMOLOGY
End: 2024-09-18
Payer: MEDICARE

## 2024-09-18 DIAGNOSIS — Z96.1: ICD-10-CM

## 2024-09-18 PROCEDURE — 99024 POSTOP FOLLOW-UP VISIT: CPT | Performed by: STUDENT IN AN ORGANIZED HEALTH CARE EDUCATION/TRAINING PROGRAM

## 2024-09-23 ENCOUNTER — OFFICE (OUTPATIENT)
Dept: URBAN - METROPOLITAN AREA CLINIC 113 | Facility: CLINIC | Age: 74
Setting detail: OPHTHALMOLOGY
End: 2024-09-23
Payer: MEDICARE

## 2024-09-23 DIAGNOSIS — Z96.1: ICD-10-CM

## 2024-09-23 DIAGNOSIS — H25.11: ICD-10-CM

## 2024-09-23 PROCEDURE — 92136 OPHTHALMIC BIOMETRY: CPT | Mod: 26,RT | Performed by: STUDENT IN AN ORGANIZED HEALTH CARE EDUCATION/TRAINING PROGRAM

## 2024-09-23 PROCEDURE — 99024 POSTOP FOLLOW-UP VISIT: CPT | Performed by: STUDENT IN AN ORGANIZED HEALTH CARE EDUCATION/TRAINING PROGRAM

## 2024-10-01 ENCOUNTER — AMBULATORY SURGERY CENTER (OUTPATIENT)
Dept: URBAN - METROPOLITAN AREA SURGERY 4 | Facility: SURGERY | Age: 74
Setting detail: OPHTHALMOLOGY
End: 2024-10-01
Payer: MEDICARE

## 2024-10-01 DIAGNOSIS — H52.212: ICD-10-CM

## 2024-10-01 DIAGNOSIS — H25.12: ICD-10-CM

## 2024-10-01 PROCEDURE — 68841 INSJ RX ELUT IMPLT LAC CANAL: CPT | Mod: 79,LT | Performed by: STUDENT IN AN ORGANIZED HEALTH CARE EDUCATION/TRAINING PROGRAM

## 2024-10-01 PROCEDURE — A9270 NON-COVERED ITEM OR SERVICE: HCPCS | Mod: GY | Performed by: STUDENT IN AN ORGANIZED HEALTH CARE EDUCATION/TRAINING PROGRAM

## 2024-10-01 PROCEDURE — 66984 XCAPSL CTRC RMVL W/O ECP: CPT | Mod: 79,LT | Performed by: STUDENT IN AN ORGANIZED HEALTH CARE EDUCATION/TRAINING PROGRAM

## 2024-10-01 PROCEDURE — FEMTO FEMTOSECOND LASER: Mod: GY | Performed by: STUDENT IN AN ORGANIZED HEALTH CARE EDUCATION/TRAINING PROGRAM

## 2024-10-02 ENCOUNTER — OFFICE (OUTPATIENT)
Dept: URBAN - METROPOLITAN AREA CLINIC 105 | Facility: CLINIC | Age: 74
Setting detail: OPHTHALMOLOGY
End: 2024-10-02
Payer: MEDICARE

## 2024-10-02 DIAGNOSIS — Z96.1: ICD-10-CM

## 2024-10-02 PROBLEM — H25.11 CATARACT SENILE NUCLEAR SCLEROSIS;  , RIGHT EYE: Status: RESOLVED | Noted: 2024-09-18 | Resolved: 2024-10-02

## 2024-10-02 PROCEDURE — 99024 POSTOP FOLLOW-UP VISIT: CPT | Performed by: STUDENT IN AN ORGANIZED HEALTH CARE EDUCATION/TRAINING PROGRAM

## 2024-10-02 ASSESSMENT — REFRACTION_AUTOREFRACTION
OD_AXIS: 085
OS_CYLINDER: -0.75
OD_SPHERE: +0.25
OS_AXIS: 083
OD_CYLINDER: -1.25
OS_SPHERE: 0.00

## 2024-10-02 ASSESSMENT — KERATOMETRY
OS_K2POWER_DIOPTERS: 44.75
OS_AXISANGLE_DEGREES: 090
OD_K1POWER_DIOPTERS: 44.50
OD_AXISANGLE_DEGREES: 130
OD_K2POWER_DIOPTERS: 45.25
OS_K1POWER_DIOPTERS: 44.75

## 2024-10-02 ASSESSMENT — CORNEAL EDEMA CLINICAL DESCRIPTION
OS_CORNEALEDEMA: T
OD_CORNEALEDEMA: 1+

## 2024-10-02 ASSESSMENT — REFRACTION_CURRENTRX
OS_AXIS: 180
OS_CYLINDER: SPH
OS_SPHERE: +2.25
OD_CYLINDER: SPH
OS_ADD: +2.50
OD_ADD: +2.50
OD_SPHERE: +1.75
OS_OVR_VA: 20/
OD_AXIS: 180
OD_OVR_VA: 20/

## 2024-10-02 ASSESSMENT — CONFRONTATIONAL VISUAL FIELD TEST (CVF)
OD_FINDINGS: FULL
OS_FINDINGS: FULL

## 2024-10-02 ASSESSMENT — REFRACTION_MANIFEST
OS_SPHERE: +2.00
OS_VA1: 20/NI
OD_SPHERE: +2.00
OD_VA1: 20/NI
OD_CYLINDER: -0.75
OS_AXIS: 089
OD_AXIS: 115
OS_CYLINDER: -1.00

## 2024-10-02 ASSESSMENT — VISUAL ACUITY
OS_BCVA: 20/60+
OD_BCVA: 20/40-

## 2024-10-02 ASSESSMENT — PACHYMETRY
OS_CT_UM: 620
OD_CT_UM: 606
OD_CT_CORRECTION: -4
OS_CT_CORRECTION: -6

## 2024-10-07 ENCOUNTER — OFFICE (OUTPATIENT)
Dept: URBAN - METROPOLITAN AREA CLINIC 113 | Facility: CLINIC | Age: 74
Setting detail: OPHTHALMOLOGY
End: 2024-10-07
Payer: MEDICARE

## 2024-10-07 DIAGNOSIS — Z96.1: ICD-10-CM

## 2024-10-07 PROCEDURE — 99024 POSTOP FOLLOW-UP VISIT: CPT | Performed by: STUDENT IN AN ORGANIZED HEALTH CARE EDUCATION/TRAINING PROGRAM

## 2024-10-07 ASSESSMENT — REFRACTION_AUTOREFRACTION
OD_SPHERE: +0.50
OD_AXIS: 093
OS_CYLINDER: -1.00
OD_CYLINDER: -1.50
OS_SPHERE: PLANO
OS_AXIS: 083

## 2024-10-07 ASSESSMENT — KERATOMETRY
OD_K2POWER_DIOPTERS: 44.50
OS_K1POWER_DIOPTERS: 44.75
OS_K2POWER_DIOPTERS: 45.00
OD_AXISANGLE_DEGREES: 001
OS_AXISANGLE_DEGREES: 169
OD_K1POWER_DIOPTERS: 43.75

## 2024-10-07 ASSESSMENT — REFRACTION_CURRENTRX
OS_ADD: +2.50
OS_AXIS: 180
OS_OVR_VA: 20/
OD_SPHERE: +2.50
OD_OVR_VA: 20/
OD_OVR_VA: 20/
OS_SPHERE: +2.25
OS_CYLINDER: SPH
OD_VPRISM_DIRECTION: SV
OD_AXIS: 180
OS_SPHERE: +2.50
OD_SPHERE: +1.75
OS_VPRISM_DIRECTION: SV
OS_OVR_VA: 20/
OD_CYLINDER: SPH
OD_ADD: +2.50

## 2024-10-07 ASSESSMENT — REFRACTION_MANIFEST
OD_VA1: 20/NI
OS_CYLINDER: -1.00
OD_SPHERE: +2.00
OS_VA1: 20/NI
OD_AXIS: 115
OS_AXIS: 089
OD_CYLINDER: -0.75
OS_SPHERE: +2.00

## 2024-10-07 ASSESSMENT — CORNEAL EDEMA CLINICAL DESCRIPTION
OS_CORNEALEDEMA: T
OD_CORNEALEDEMA: 1+

## 2024-10-07 ASSESSMENT — CONFRONTATIONAL VISUAL FIELD TEST (CVF)
OD_FINDINGS: FULL
OS_FINDINGS: FULL

## 2024-10-07 ASSESSMENT — VISUAL ACUITY
OS_BCVA: 20/50-
OD_BCVA: 20/40-

## 2024-10-07 ASSESSMENT — PACHYMETRY
OS_CT_CORRECTION: -6
OD_CT_CORRECTION: -4
OS_CT_UM: 620
OD_CT_UM: 606

## 2024-10-07 ASSESSMENT — TONOMETRY: OD_IOP_MMHG: 16

## 2024-10-08 NOTE — REVIEW OF SYSTEMS
Health Maintenance       Shingles Vaccine (1 of 2)  Never done    COVID-19 Vaccine (3 - 2023-24 season)  Overdue since 9/1/2024    Influenza Vaccine (1)  Order placed this encounter    Depression Screening (Yearly)  Due since 10/5/2024           Following review of the above:  Patient is not proceeding with: COVID-19 and Shingles    Note: Refer to final orders and clinician documentation.       [Negative] : Allergic/Immunologic

## 2024-10-13 ENCOUNTER — OUTPATIENT (OUTPATIENT)
Dept: OUTPATIENT SERVICES | Facility: HOSPITAL | Age: 74
LOS: 1 days | Discharge: HOME | End: 2024-10-13

## 2024-10-13 DIAGNOSIS — C50.919 MALIGNANT NEOPLASM OF UNSPECIFIED SITE OF UNSPECIFIED FEMALE BREAST: ICD-10-CM

## 2024-10-16 ENCOUNTER — APPOINTMENT (OUTPATIENT)
Dept: HEMATOLOGY ONCOLOGY | Facility: CLINIC | Age: 74
End: 2024-10-16
Payer: MEDICARE

## 2024-10-16 ENCOUNTER — NON-APPOINTMENT (OUTPATIENT)
Age: 74
End: 2024-10-16

## 2024-10-16 VITALS
WEIGHT: 188 LBS | DIASTOLIC BLOOD PRESSURE: 84 MMHG | SYSTOLIC BLOOD PRESSURE: 164 MMHG | OXYGEN SATURATION: 96 % | BODY MASS INDEX: 33.31 KG/M2 | TEMPERATURE: 98 F | HEIGHT: 63 IN | HEART RATE: 93 BPM

## 2024-10-16 DIAGNOSIS — C50.919 MALIGNANT NEOPLASM OF UNSPECIFIED SITE OF UNSPECIFIED FEMALE BREAST: ICD-10-CM

## 2024-10-16 PROCEDURE — 99214 OFFICE O/P EST MOD 30 MIN: CPT

## 2024-10-28 ENCOUNTER — OFFICE (OUTPATIENT)
Dept: URBAN - METROPOLITAN AREA CLINIC 113 | Facility: CLINIC | Age: 74
Setting detail: OPHTHALMOLOGY
End: 2024-10-28
Payer: MEDICARE

## 2024-10-28 DIAGNOSIS — Z96.1: ICD-10-CM

## 2024-10-28 PROCEDURE — 99024 POSTOP FOLLOW-UP VISIT: CPT | Performed by: STUDENT IN AN ORGANIZED HEALTH CARE EDUCATION/TRAINING PROGRAM

## 2024-10-28 ASSESSMENT — KERATOMETRY
OS_K2POWER_DIOPTERS: 44.75
OS_K1POWER_DIOPTERS: 44.25
OS_AXISANGLE_DEGREES: 003
OD_AXISANGLE_DEGREES: 164
OD_K1POWER_DIOPTERS: 44.25
OD_K2POWER_DIOPTERS: 45.00

## 2024-10-28 ASSESSMENT — REFRACTION_MANIFEST
OD_AXIS: 115
OS_SPHERE: +2.00
OD_CYLINDER: -0.75
OD_SPHERE: +2.00
OD_VA1: 20/NI
OS_VA1: 20/NI
OS_AXIS: 089
OS_CYLINDER: -1.00

## 2024-10-28 ASSESSMENT — VISUAL ACUITY
OS_BCVA: 20/50-
OD_BCVA: 20/30-2

## 2024-10-28 ASSESSMENT — PACHYMETRY
OS_CT_UM: 620
OD_CT_UM: 606
OD_CT_CORRECTION: -4
OS_CT_CORRECTION: -6

## 2024-10-28 ASSESSMENT — REFRACTION_CURRENTRX
OD_AXIS: 180
OS_VPRISM_DIRECTION: SV
OS_OVR_VA: 20/
OD_CYLINDER: SPH
OS_SPHERE: +2.50
OD_OVR_VA: 20/
OD_SPHERE: +1.75
OS_OVR_VA: 20/
OS_ADD: +2.50
OD_ADD: +2.50
OD_OVR_VA: 20/
OS_SPHERE: +2.25
OD_VPRISM_DIRECTION: SV
OS_AXIS: 180
OS_CYLINDER: SPH
OD_SPHERE: +2.50

## 2024-10-28 ASSESSMENT — REFRACTION_AUTOREFRACTION
OS_SPHERE: +0.25
OD_SPHERE: 0.00
OD_AXIS: 086
OS_CYLINDER: -1.00
OS_AXIS: 086
OD_CYLINDER: -1.25

## 2024-10-28 ASSESSMENT — CORNEAL EDEMA CLINICAL DESCRIPTION
OS_CORNEALEDEMA: ABSENT
OD_CORNEALEDEMA: ABSENT

## 2024-12-02 ENCOUNTER — RX ONLY (RX ONLY)
Age: 74
End: 2024-12-02

## 2024-12-02 ENCOUNTER — OFFICE (OUTPATIENT)
Dept: URBAN - METROPOLITAN AREA CLINIC 113 | Facility: CLINIC | Age: 74
Setting detail: OPHTHALMOLOGY
End: 2024-12-02
Payer: MEDICARE

## 2024-12-02 DIAGNOSIS — H11.31: ICD-10-CM

## 2024-12-02 DIAGNOSIS — H01.004: ICD-10-CM

## 2024-12-02 DIAGNOSIS — H01.001: ICD-10-CM

## 2024-12-02 DIAGNOSIS — H16.221: ICD-10-CM

## 2024-12-02 PROCEDURE — 99213 OFFICE O/P EST LOW 20 MIN: CPT | Mod: 24 | Performed by: STUDENT IN AN ORGANIZED HEALTH CARE EDUCATION/TRAINING PROGRAM

## 2024-12-02 ASSESSMENT — REFRACTION_MANIFEST
OS_VA1: 20/NI
OS_CYLINDER: -1.00
OD_VA1: 20/NI
OD_AXIS: 115
OS_AXIS: 089
OD_CYLINDER: -0.75
OD_SPHERE: +2.00
OS_SPHERE: +2.00

## 2024-12-02 ASSESSMENT — PACHYMETRY
OS_CT_UM: 620
OD_CT_UM: 606
OS_CT_CORRECTION: -6
OD_CT_CORRECTION: -4

## 2024-12-02 ASSESSMENT — REFRACTION_AUTOREFRACTION
OS_AXIS: 085
OS_CYLINDER: -1.25
OS_SPHERE: +0.75
OD_AXIS: 090
OD_CYLINDER: -1.25
OD_SPHERE: +0.50

## 2024-12-02 ASSESSMENT — REFRACTION_CURRENTRX
OD_OVR_VA: 20/
OS_SPHERE: +2.25
OS_OVR_VA: 20/
OD_CYLINDER: SPH
OD_VPRISM_DIRECTION: SV
OS_OVR_VA: 20/
OS_ADD: +2.50
OS_VPRISM_DIRECTION: SV
OS_SPHERE: +2.50
OS_AXIS: 180
OD_ADD: +2.50
OD_SPHERE: +2.50
OD_OVR_VA: 20/
OD_AXIS: 180
OD_SPHERE: +1.75
OS_CYLINDER: SPH

## 2024-12-02 ASSESSMENT — LID EXAM ASSESSMENTS
OD_BLEPHARITIS: RUL 1+
OS_BLEPHARITIS: LUL 1+

## 2024-12-02 ASSESSMENT — VISUAL ACUITY
OD_BCVA: 20/40
OS_BCVA: 20/50-

## 2024-12-02 ASSESSMENT — KERATOMETRY
OS_K2POWER_DIOPTERS: 44.75
OD_K1POWER_DIOPTERS: 44.00
OD_AXISANGLE_DEGREES: 160
OS_AXISANGLE_DEGREES: 179
OD_K2POWER_DIOPTERS: 45.00
OS_K1POWER_DIOPTERS: 44.50

## 2024-12-02 ASSESSMENT — CONFRONTATIONAL VISUAL FIELD TEST (CVF)
OD_FINDINGS: FULL
OS_FINDINGS: FULL

## 2024-12-02 ASSESSMENT — SUPERFICIAL PUNCTATE KERATITIS (SPK): OD_SPK: 3+ 4+

## 2025-01-29 ENCOUNTER — OFFICE (OUTPATIENT)
Dept: URBAN - METROPOLITAN AREA CLINIC 113 | Facility: CLINIC | Age: 75
Setting detail: OPHTHALMOLOGY
End: 2025-01-29
Payer: MEDICARE

## 2025-01-29 DIAGNOSIS — H16.221: ICD-10-CM

## 2025-01-29 DIAGNOSIS — H01.001: ICD-10-CM

## 2025-01-29 DIAGNOSIS — H40.013: ICD-10-CM

## 2025-01-29 DIAGNOSIS — D31.31: ICD-10-CM

## 2025-01-29 DIAGNOSIS — H01.004: ICD-10-CM

## 2025-01-29 DIAGNOSIS — E11.3393: ICD-10-CM

## 2025-01-29 PROCEDURE — 92014 COMPRE OPH EXAM EST PT 1/>: CPT | Performed by: STUDENT IN AN ORGANIZED HEALTH CARE EDUCATION/TRAINING PROGRAM

## 2025-01-29 PROCEDURE — 92250 FUNDUS PHOTOGRAPHY W/I&R: CPT | Performed by: STUDENT IN AN ORGANIZED HEALTH CARE EDUCATION/TRAINING PROGRAM

## 2025-01-29 ASSESSMENT — REFRACTION_MANIFEST
OD_SPHERE: +2.00
OD_AXIS: 115
OS_VA1: 20/NI
OD_VA1: 20/NI
OD_CYLINDER: -0.75
OS_CYLINDER: -1.00
OS_AXIS: 089
OS_SPHERE: +2.00

## 2025-01-29 ASSESSMENT — REFRACTION_AUTOREFRACTION
OS_AXIS: 088
OD_SPHERE: +0.50
OS_SPHERE: +0.50
OD_AXIS: 087
OD_CYLINDER: -1.25
OS_CYLINDER: -1.00

## 2025-01-29 ASSESSMENT — CONFRONTATIONAL VISUAL FIELD TEST (CVF)
OD_FINDINGS: FULL
OS_FINDINGS: FULL

## 2025-01-29 ASSESSMENT — VISUAL ACUITY
OD_BCVA: 20/40
OS_BCVA: 20/50

## 2025-01-29 ASSESSMENT — PACHYMETRY
OD_CT_UM: 606
OD_CT_CORRECTION: -4
OS_CT_CORRECTION: -6
OS_CT_UM: 620

## 2025-01-29 ASSESSMENT — KERATOMETRY
OD_K2POWER_DIOPTERS: 45.50
OS_K2POWER_DIOPTERS: 44.75
OS_K1POWER_DIOPTERS: 43.75
OD_AXISANGLE_DEGREES: 114
OS_AXISANGLE_DEGREES: 010
OD_K1POWER_DIOPTERS: 44.50

## 2025-01-29 ASSESSMENT — REFRACTION_CURRENTRX
OD_OVR_VA: 20/
OD_OVR_VA: 20/
OS_VPRISM_DIRECTION: SV
OD_ADD: +2.50
OS_ADD: +2.50
OS_AXIS: 180
OS_SPHERE: +2.25
OS_OVR_VA: 20/
OD_CYLINDER: SPH
OD_SPHERE: +1.75
OS_OVR_VA: 20/
OS_SPHERE: +2.50
OD_VPRISM_DIRECTION: SV
OS_CYLINDER: SPH
OD_AXIS: 180
OD_SPHERE: +2.50

## 2025-01-29 ASSESSMENT — TONOMETRY
OD_IOP_MMHG: 17
OS_IOP_MMHG: 16

## 2025-01-29 ASSESSMENT — SUPERFICIAL PUNCTATE KERATITIS (SPK): OD_SPK: 1+

## 2025-01-29 ASSESSMENT — LID EXAM ASSESSMENTS
OD_BLEPHARITIS: RUL 1+
OS_BLEPHARITIS: LUL 1+

## 2025-02-25 ENCOUNTER — OFFICE (OUTPATIENT)
Dept: URBAN - METROPOLITAN AREA CLINIC 113 | Facility: CLINIC | Age: 75
Setting detail: OPHTHALMOLOGY
End: 2025-02-25
Payer: MEDICARE

## 2025-02-25 DIAGNOSIS — T15.11XA: ICD-10-CM

## 2025-02-25 DIAGNOSIS — H52.7: ICD-10-CM

## 2025-02-25 DIAGNOSIS — H16.221: ICD-10-CM

## 2025-02-25 DIAGNOSIS — E11.3393: ICD-10-CM

## 2025-02-25 DIAGNOSIS — H01.004: ICD-10-CM

## 2025-02-25 DIAGNOSIS — H01.001: ICD-10-CM

## 2025-02-25 PROCEDURE — 92015 DETERMINE REFRACTIVE STATE: CPT | Performed by: STUDENT IN AN ORGANIZED HEALTH CARE EDUCATION/TRAINING PROGRAM

## 2025-02-25 PROCEDURE — 65205 REMOVE FOREIGN BODY FROM EYE: CPT | Mod: RT | Performed by: STUDENT IN AN ORGANIZED HEALTH CARE EDUCATION/TRAINING PROGRAM

## 2025-02-25 PROCEDURE — 92134 CPTRZ OPH DX IMG PST SGM RTA: CPT | Performed by: STUDENT IN AN ORGANIZED HEALTH CARE EDUCATION/TRAINING PROGRAM

## 2025-02-25 PROCEDURE — 99213 OFFICE O/P EST LOW 20 MIN: CPT | Mod: 25 | Performed by: STUDENT IN AN ORGANIZED HEALTH CARE EDUCATION/TRAINING PROGRAM

## 2025-02-25 ASSESSMENT — REFRACTION_AUTOREFRACTION
OD_CYLINDER: -2.00
OS_SPHERE: +0.50
OS_CYLINDER: -0.75
OD_AXIS: 096
OS_AXIS: 084
OD_SPHERE: +1.00

## 2025-02-25 ASSESSMENT — REFRACTION_MANIFEST
OD_ADD: +2.50
OS_ADD: +2.50
OD_VA1: 20/20-1
OS_SPHERE: +0.50
OS_VA2: 20/20
OD_CYLINDER: -1.50
OD_VA2: 20/20
OD_AXIS: 096
OS_AXIS: 084
OD_SPHERE: +1.00
OS_CYLINDER: -0.50
OS_VA1: 20/20-

## 2025-02-25 ASSESSMENT — TONOMETRY
OS_IOP_MMHG: 18
OD_IOP_MMHG: 16

## 2025-02-25 ASSESSMENT — PACHYMETRY
OD_CT_CORRECTION: -4
OS_CT_CORRECTION: -6
OD_CT_UM: 606
OS_CT_UM: 620

## 2025-02-25 ASSESSMENT — CONFRONTATIONAL VISUAL FIELD TEST (CVF)
OD_FINDINGS: FULL
OS_FINDINGS: FULL

## 2025-02-25 ASSESSMENT — SUPERFICIAL PUNCTATE KERATITIS (SPK): OD_SPK: 1+

## 2025-02-25 ASSESSMENT — KERATOMETRY
OS_K1POWER_DIOPTERS: 44.25
OD_K2POWER_DIOPTERS: 44.75
OD_AXISANGLE_DEGREES: 001
OS_AXISANGLE_DEGREES: 038
OS_K2POWER_DIOPTERS: 44.50
OD_K1POWER_DIOPTERS: 43.75

## 2025-02-25 ASSESSMENT — REFRACTION_CURRENTRX
OD_OVR_VA: 20/
OD_CYLINDER: SPH
OS_SPHERE: +2.25
OD_VPRISM_DIRECTION: SV
OS_OVR_VA: 20/
OS_CYLINDER: SPH
OD_OVR_VA: 20/
OS_AXIS: 180
OS_VPRISM_DIRECTION: SV
OS_OVR_VA: 20/
OD_AXIS: 180
OD_SPHERE: +1.75
OS_ADD: +2.50
OD_SPHERE: +2.50
OS_SPHERE: +2.50
OD_ADD: +2.50

## 2025-02-25 ASSESSMENT — LID EXAM ASSESSMENTS
OS_BLEPHARITIS: LUL 1+
OD_BLEPHARITIS: RUL 1+

## 2025-02-25 ASSESSMENT — VISUAL ACUITY
OS_BCVA: 20/50
OD_BCVA: 20/40

## 2025-08-27 ENCOUNTER — APPOINTMENT (OUTPATIENT)
Dept: MRI IMAGING | Facility: CLINIC | Age: 75
End: 2025-08-27
Payer: MEDICARE

## 2025-08-27 ENCOUNTER — APPOINTMENT (OUTPATIENT)
Dept: ORTHOPEDIC SURGERY | Facility: CLINIC | Age: 75
End: 2025-08-27
Payer: MEDICARE

## 2025-08-27 DIAGNOSIS — G89.29 CERVICALGIA: ICD-10-CM

## 2025-08-27 DIAGNOSIS — M48.062 SPINAL STENOSIS, LUMBAR REGION WITH NEUROGENIC CLAUDICATION: ICD-10-CM

## 2025-08-27 DIAGNOSIS — M54.2 CERVICALGIA: ICD-10-CM

## 2025-08-27 DIAGNOSIS — M43.16 SPONDYLOLISTHESIS, LUMBAR REGION: ICD-10-CM

## 2025-08-27 DIAGNOSIS — Z63.4 DISAPPEARANCE AND DEATH OF FAMILY MEMBER: ICD-10-CM

## 2025-08-27 DIAGNOSIS — R26.9 UNSPECIFIED ABNORMALITIES OF GAIT AND MOBILITY: ICD-10-CM

## 2025-08-27 PROCEDURE — 99203 OFFICE O/P NEW LOW 30 MIN: CPT

## 2025-08-27 PROCEDURE — 72141 MRI NECK SPINE W/O DYE: CPT

## 2025-08-27 RX ORDER — EPINEPHRIN IN SOD CHLOR,ISO/PF 1 MG/ML
1 SYRINGE (ML) INJECTION
Refills: 0 | Status: ACTIVE | COMMUNITY

## 2025-08-27 RX ORDER — INSULIN GLARGINE 300 [IU]/ML
INJECTION, SOLUTION SUBCUTANEOUS
Refills: 0 | Status: ACTIVE | COMMUNITY

## 2025-08-27 RX ORDER — MULTIVIT-MIN/FA/LYCOPEN/LUTEIN .4-300-25
TABLET ORAL
Refills: 0 | Status: ACTIVE | COMMUNITY

## 2025-08-27 RX ORDER — ELECTROLYTES/DEXTROSE
SOLUTION, ORAL ORAL
Refills: 0 | Status: ACTIVE | COMMUNITY

## 2025-08-27 RX ORDER — ALBUTEROL 90 MCG
AEROSOL (GRAM) INHALATION
Refills: 0 | Status: ACTIVE | COMMUNITY

## 2025-08-27 RX ORDER — METOPROLOL TARTRATE 75 MG/1
TABLET, FILM COATED ORAL
Refills: 0 | Status: ACTIVE | COMMUNITY

## 2025-08-27 RX ORDER — CYCLOBENZAPRINE HCL 5 MG
TABLET ORAL
Refills: 0 | Status: ACTIVE | COMMUNITY

## 2025-08-27 RX ORDER — ATORVASTATIN CALCIUM 80 MG/1
TABLET, FILM COATED ORAL
Refills: 0 | Status: ACTIVE | COMMUNITY

## 2025-08-27 SDOH — SOCIAL STABILITY - SOCIAL INSECURITY: DISSAPEARANCE AND DEATH OF FAMILY MEMBER: Z63.4

## 2025-09-10 ENCOUNTER — TRANSCRIPTION ENCOUNTER (OUTPATIENT)
Age: 75
End: 2025-09-10

## 2025-09-17 ENCOUNTER — APPOINTMENT (OUTPATIENT)
Dept: ORTHOPEDIC SURGERY | Facility: CLINIC | Age: 75
End: 2025-09-17
Payer: MEDICARE

## 2025-09-17 VITALS — WEIGHT: 188 LBS | HEIGHT: 63 IN | BODY MASS INDEX: 33.31 KG/M2

## 2025-09-17 DIAGNOSIS — M43.16 SPONDYLOLISTHESIS, LUMBAR REGION: ICD-10-CM

## 2025-09-17 DIAGNOSIS — M48.062 SPINAL STENOSIS, LUMBAR REGION WITH NEUROGENIC CLAUDICATION: ICD-10-CM

## 2025-09-17 DIAGNOSIS — R26.9 UNSPECIFIED ABNORMALITIES OF GAIT AND MOBILITY: ICD-10-CM

## 2025-09-17 DIAGNOSIS — M48.02 SPINAL STENOSIS, CERVICAL REGION: ICD-10-CM

## 2025-09-17 PROCEDURE — 99214 OFFICE O/P EST MOD 30 MIN: CPT
